# Patient Record
Sex: FEMALE | Race: BLACK OR AFRICAN AMERICAN | NOT HISPANIC OR LATINO | Employment: UNEMPLOYED | URBAN - METROPOLITAN AREA
[De-identification: names, ages, dates, MRNs, and addresses within clinical notes are randomized per-mention and may not be internally consistent; named-entity substitution may affect disease eponyms.]

---

## 2018-10-09 ENCOUNTER — HOSPITAL ENCOUNTER (INPATIENT)
Facility: CLINIC | Age: 18
LOS: 24 days | Discharge: PSYCHIATRIC OR SUBSTANCE USE FACILITY | End: 2018-11-02
Attending: EMERGENCY MEDICINE | Admitting: PSYCHIATRY & NEUROLOGY
Payer: COMMERCIAL

## 2018-10-09 DIAGNOSIS — F33.9 EPISODE OF RECURRENT MAJOR DEPRESSIVE DISORDER, UNSPECIFIED DEPRESSION EPISODE SEVERITY (H): ICD-10-CM

## 2018-10-09 DIAGNOSIS — R45.851 SUICIDE IDEATION: ICD-10-CM

## 2018-10-09 DIAGNOSIS — F25.1 SCHIZOAFFECTIVE DISORDER, DEPRESSIVE TYPE (H): Primary | ICD-10-CM

## 2018-10-09 PROBLEM — F29 PSYCHOSIS (H): Status: ACTIVE | Noted: 2018-10-09

## 2018-10-09 PROCEDURE — 99285 EMERGENCY DEPT VISIT HI MDM: CPT | Mod: Z6 | Performed by: EMERGENCY MEDICINE

## 2018-10-09 PROCEDURE — 90791 PSYCH DIAGNOSTIC EVALUATION: CPT

## 2018-10-09 PROCEDURE — 99223 1ST HOSP IP/OBS HIGH 75: CPT | Mod: AI | Performed by: PSYCHIATRY & NEUROLOGY

## 2018-10-09 PROCEDURE — 25000132 ZZH RX MED GY IP 250 OP 250 PS 637: Performed by: STUDENT IN AN ORGANIZED HEALTH CARE EDUCATION/TRAINING PROGRAM

## 2018-10-09 PROCEDURE — 12400007 ZZH R&B MH INTERMEDIATE UMMC

## 2018-10-09 PROCEDURE — 99285 EMERGENCY DEPT VISIT HI MDM: CPT | Mod: 25 | Performed by: EMERGENCY MEDICINE

## 2018-10-09 RX ORDER — POLYETHYLENE GLYCOL 3350 17 G
2-4 POWDER IN PACKET (EA) ORAL
Status: DISCONTINUED | OUTPATIENT
Start: 2018-10-09 | End: 2018-11-02 | Stop reason: HOSPADM

## 2018-10-09 RX ORDER — ACETAMINOPHEN 325 MG/1
650 TABLET ORAL EVERY 4 HOURS PRN
Status: DISCONTINUED | OUTPATIENT
Start: 2018-10-09 | End: 2018-11-02 | Stop reason: HOSPADM

## 2018-10-09 RX ORDER — HYDROXYZINE HYDROCHLORIDE 25 MG/1
25 TABLET, FILM COATED ORAL EVERY 4 HOURS PRN
Status: DISCONTINUED | OUTPATIENT
Start: 2018-10-09 | End: 2018-10-25

## 2018-10-09 RX ORDER — OLANZAPINE 10 MG/2ML
10 INJECTION, POWDER, FOR SOLUTION INTRAMUSCULAR
Status: DISCONTINUED | OUTPATIENT
Start: 2018-10-09 | End: 2018-10-25

## 2018-10-09 RX ORDER — OLANZAPINE 10 MG/1
10 TABLET ORAL
Status: DISCONTINUED | OUTPATIENT
Start: 2018-10-09 | End: 2018-10-25

## 2018-10-09 RX ADMIN — OLANZAPINE 10 MG: 10 TABLET, FILM COATED ORAL at 22:06

## 2018-10-09 RX ADMIN — HYDROXYZINE HYDROCHLORIDE 25 MG: 25 TABLET ORAL at 22:06

## 2018-10-09 ASSESSMENT — ENCOUNTER SYMPTOMS
ABDOMINAL PAIN: 0
SHORTNESS OF BREATH: 0
NECK STIFFNESS: 0
FEVER: 0
WOUND: 1
COLOR CHANGE: 0
EYE REDNESS: 0
HEADACHES: 0
SLEEP DISTURBANCE: 1
DIFFICULTY URINATING: 0
ARTHRALGIAS: 0
DYSPHORIC MOOD: 1
CONFUSION: 0

## 2018-10-09 NOTE — ED NOTES
I have performed an in person assessment of the patient. Based on this assessment the patient no longer requires a one on one attendant at this point in time.    SHNE WOLF MD, MD  8:21 AM  October 9, 2018         Shen Wolf MD  10/09/18 0821

## 2018-10-09 NOTE — IP AVS SNAPSHOT
MRN:[id number]                      After Visit Summary   10/9/2018    Nelida Montano    MRN: [id number]           Thank you!     Thank you for choosing [facility] for your care. Our goal is always to provide you with excellent care.        Patient Information     Date Of Birth          2000        Designated Caregiver       Most Recent Value    Caregiver    Will someone help with your care after discharge? no      About your hospital stay     You were admitted on:  October 9, 2018 You last received care in the:  [facility]    You were discharged on:  November 2, 2018       Who to Call     For medical emergencies, please call 911.  For non-urgent questions about your medical care, please call your primary care provider or clinic, None          Attending Provider     Provider Specialty    Shen Parikh MD Emergency Medicine    Nghia Duncan MD Psychiatry    Kylah, Radha Cullen MD Psychiatry       Primary Care Provider Fax #    Physician No Ref-Primary [phone]      Further instructions from your care team        Behavioral Discharge Planning and Instructions      Summary:  You were admitted on 10/9/2018  due to Psychotic Symptomology and Suicidal Ideations.  You were treated by Dr. Nghia Duncan MD and Dr. Radha Montero MD and discharged on 11/2/18 from [facility] to [facility]    During your hospitalization a petition for commitment(MI) and Green was filed with [facility] and supported.  On 10/25/18 the court issued an order for Commitment and the petition for Green was dismissed.  You will discharge today from the hospital with a Provisional Discharge Agreement (see your copy) The terms of your Provisional Discharge will remain in effect for the duration of your commitment unless the Provisional Discharge plan is later modified.  The initial period of your commitment is effective is 6 months from the date of the order - unless this date is changed by the  courts prior thereto.     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 Continue working with your  for ongoing resources and to allow for monitoring of the terms of your commitment,      Monday November 19, 2018 - 8:20am arrive - 8:40am appt.  Psychiatrist - Dr. Shen Yee     [facility] -[address] [place] [address] Phone: [phone] Fax: [phone]  It is very important you attend this appointment as you missed without canceling a prior intake appointment. If you miss without canceling/ rescheduling minimally 24 hour in advance you will not be allowed to schedule with this clinic in the future.   Please bring your insurance card, ID and medication list from the [facility] program.     Attend all scheduled appointments with your outpatient providers. Call at least 24 hours in advance if you need to reschedule an appointment to ensure continued access to your outpatient providers.   Major Treatments, Procedures and Findings:  You were provided with: a psychiatric assessment, assessed for medical stability, medication evaluation and/or management, group therapy and milieu management    Symptoms to Report: feeling more aggressive, increased confusion, losing more sleep, mood getting worse or thoughts of suicide    Early warning signs can include: increased depression or anxiety sleep disturbances increased thoughts or behaviors of suicide or self-harm   "increased unusual thinking, such as paranoia or hearing voices    Safety and Wellness:  Take all medicines as directed.  Make no changes unless your doctor suggests them.      Follow treatment recommendations.  Refrain from alcohol and non-prescribed drugs.     Resources:   Crisis Intervention: 891.423.9335 or 226-521-6036 (TTY: 682.822.9430).  Call anytime for help.  National [place] on Mental Illness (www.mn.amnohar.org): 194.121.6344 or 134-008-4083.  Suicide Awareness Voices of Education (SAVE) (www.save.org): 884-511-SAVE (6983)  Text 4 Life: txt \"LIFE\" to 49932 for immediate support and crisis intervention  Crisis text line: Text \"[place]\" to 703608. Free, confidential, 24/7.  Crisis Intervention: 105.791.7164 or 290-624-9357. Call anytime for help.   [facility] Crisis Team:  [phone]    The treatment team has appreciated the opportunity to work with you.     If you have any questions or concerns our unit number is [phone]        Pending Results     No orders found from 10/7/2018 to 10/10/2018.            Statement of Approval     Ordered          11/02/18 0850  I have reviewed and agree with all the recommendations and orders detailed in this document.  EFFECTIVE NOW     Approved and electronically signed by:  Manuel Dhillon MD             Admission Information     Date & Time Provider Department Dept. Phone    10/9/2018 Radha Montero MD [facility] [phone]      Your Vitals Were     Blood Pressure Pulse Temperature Respirations Height Weight    122/78 (BP Location: Left arm) 101 96.6  F (35.9  C) (Tympanic) 16 1.6 m (5' 3\") 67.4 kg (148 lb 8 oz)    Last Period Pulse Oximetry BMI (Body Mass Index)             (LMP Unknown) 100% 26.31 kg/m2         MyChart Information     [employer] lets you send messages to your doctor, view your test results, renew your prescriptions, schedule appointments and more. To sign up, go to [email or web address] . Click on \"Log in\" on " "the left side of the screen, which will take you to the Welcome page. Then click on \"Sign up Now\" on the right side of the page.     You will be asked to enter the access code listed below, as well as some personal information. Please follow the directions to create your username and password.     Your access code is: [email or web address]  Expires: 2019 12:53 PM     Your access code will  in 90 days. If you need help or a new code, please call your [facility] clinic or [phone].        Care EveryWhere ID     This is your Care EveryWhere ID. This could be used by other organizations to access your [facility] medical records  [id number]        Equal Access to Services     MARYA GRIFFIN : Ann Pacheco, barrie figueredo, fercho rodriguez, sophie gleason . So [facility] [phone].    ATENCIÓN: Si habla español, tiene a sheppard disposición servicios gratuitos de asistencia lingüística. Llame al [phone].    We comply with applicable federal civil rights laws and [place] laws. We do not discriminate on the basis of race, color, national origin, age, disability, sex, sexual orientation, or gender identity.               Review of your medicines      START taking        Dose / Directions    melatonin 3 MG tablet   Used for:  Episode of recurrent major depressive disorder, unspecified depression episode severity (H)        Dose:  3 mg   Take 1 tablet (3 mg) by mouth every evening   Quantity:  30 tablet   Refills:  0       risperiDONE 4 MG tablet   Commonly known as:  risperDAL        Dose:  4 mg   Take 1 tablet (4 mg) by mouth At Bedtime   Quantity:  30 tablet   Refills:  0       risperiDONE microspheres 50 MG injection   Commonly known as:  risperDAL CONSTA        Dose:  50 mg   Start taking on:  2018   Inject 2 mLs (50 mg) into the muscle every 14 days   Quantity:  2 each   Refills:  0         STOP taking     LEXAPRO PO           OLANZAPINE PO           TRAZODONE HCL " PO                Where to get your medicines      These medications were sent to [facility] [place] [address] - [place], [place] - [address] [facility]  [address] [address]     Phone:  [phone]     melatonin 3 MG tablet    risperiDONE 4 MG tablet    risperiDONE microspheres 50 MG injection                Protect others around you: Learn how to safely use, store and throw away your medicines at www.disposemymeds.org.             Medication List: This is a list of all your medications and when to take them. Check marks below indicate your daily home schedule. Keep this list as a reference.      Medications           Morning Afternoon Evening Bedtime As Needed    melatonin 3 MG tablet   Take 1 tablet (3 mg) by mouth every evening   Last time this was given:  3 mg on 11/1/2018  6:59 PM                                risperiDONE 4 MG tablet   Commonly known as:  risperDAL   Take 1 tablet (4 mg) by mouth At Bedtime   Last time this was given:  4 mg on 11/1/2018  6:58 PM                                risperiDONE microspheres 50 MG injection   Commonly known as:  risperDAL CONSTA   Inject 2 mLs (50 mg) into the muscle every 14 days   Start taking on:  11/8/2018   Last time this was given:  25 mg on 10/25/2018  9:54 PM

## 2018-10-09 NOTE — ED NOTES
ED to Behavioral Floor Handoff    SITUATION  Nelida Montano is a 18 year old female who speaks English and lives in a home with family members The patient arrived in the ED by private car from home with a complaint of Suicidal  .The patient's current symptoms started/worsened 2 month(s) ago and during this time the symptoms have increased.   In the ED, pt was diagnosed with   Final diagnoses:   None        Initial vitals were: BP: 133/80  Pulse: 85  Temp: 98.3  F (36.8  C)  Resp: 16  SpO2: 100 %   --------  Is the patient diabetic? No   If yes, last blood glucose? --     If yes, was this treated in the ED? --  --------  Is the patient inebriated (ETOH) No or Impaired on other substances? No  MSSA done? N/A  Last MSSA score: --    Were withdrawal symptoms treated? N/A  Does the patient have a seizure history? No. If yes, date of most recent seizure--  --------  Is the patient patient experiencing suicidal ideation? has a history of suicidal attempts, most recent in the past 3 months.     Homicidal ideation? denies current or recent homicidal ideation or behaviors.    Self-injurious behavior/urges? denies current or recent self injurious behavior or ideation.  ------  Was pt aggressive in the ED No  Was a code called No  Is the pt now cooperative? Yes  -------  Meds given in ED: Medications - No data to display   Family present during ED course? No  Family currently present? No    BACKGROUND  Does the patient have a cognitive impairment or developmental disability? No  Allergies: Allergies no known allergies.   Social demographics are   Social History     Social History     Marital status: Single     Spouse name: N/A     Number of children: N/A     Years of education: N/A     Social History Main Topics     Smoking status: Current Every Day Smoker     Packs/day: 2.00     Smokeless tobacco: Never Used     Alcohol use No     Drug use: No     Sexual activity: Not Asked     Other Topics Concern     None     Social  History Narrative     None        ASSESSMENT  Labs results Labs Ordered and Resulted from Time of ED Arrival Up to the Time of Departure from the ED - No data to display   Imaging Studies: No results found for this or any previous visit (from the past 24 hour(s)).   Most recent vital signs /80  Pulse 85  Temp 98.3  F (36.8  C) (Oral)  Resp 16  LMP  (LMP Unknown)  SpO2 100%   Abnormal labs/tests/findings requiring intervention:---   Pain control: pt had none  Nausea control: pt had none    RECOMMENDATION  Are any infection precautions needed (MRSA, VRE, etc.)? No If yes, what infection? --  ---  Does the patient have mobility issues? independently. If yes, what device does the pt use? ---  ---  Is patient on 72 hour hold or commitment? Yes If on 72 hour hold, have hold and rights been given to patient? Yes  Are admitting orders written if after 10 p.m. ?N/A  Tasks needing to be completed:---     AZIZA SLADE--    [phone] [facility]   6-4617 [facility]

## 2018-10-09 NOTE — IP AVS SNAPSHOT
"    [facility]: [phone]                                              INTERAGENCY TRANSFER FORM - PHYSICIAN ORDERS   10/9/2018                    Hospital Admission Date: 10/9/2018  IDANIA ANGULO   : 2000  Sex: Female        Attending Provider: (none)    Allergies:  No Known Allergies    Infection:  None   Service:  MENTAL HEALT    Ht:  1.6 m (5' 3\")   Wt:  67.4 kg (148 lb 8 oz)   Admission Wt:  61 kg (134 lb 8 oz)    BMI:  26.31 kg/m 2   BSA:  1.73 m 2            Patient PCP Information     Provider PCP Type    Physician No Ref-Primary General      ED Clinical Impression     Diagnosis Description Comment Added By Time Added    Suicide ideation [R45.851] Suicide ideation [R45.851]  Shen Parikh MD 10/9/2018 10:50 AM    Episode of recurrent major depressive disorder, unspecified depression episode severity (H) [F33.9] Episode of recurrent major depressive disorder, unspecified depression episode severity (H) [F33.9]  Shen Parikh MD 10/9/2018 10:51 AM      Hospital Problems as of 2018              Priority Class Noted POA    Psychosis (H) Medium  10/9/2018 Yes      Non-Hospital Problems as of 2018     None      Code Status History     Date Active Date Inactive Code Status Order ID Comments User Context    10/9/2018 12:11 PM 2018 12:13 PM Full Code [id number]  Raya Lawson MD Inpatient         Medication Review      START taking        Dose / Directions Comments    melatonin 3 MG tablet   Used for:  Episode of recurrent major depressive disorder, unspecified depression episode severity (H)        Dose:  3 mg   Take 1 tablet (3 mg) by mouth every evening   Quantity:  30 tablet   Refills:  0        risperiDONE 4 MG tablet   Commonly known as:  risperDAL   Used for:  Schizoaffective disorder, depressive type (H)        Dose:  4 mg   Take 1 tablet (4 mg) by mouth At Bedtime   Quantity:  30 tablet   Refills:  0        risperiDONE microspheres 50 MG injection   Commonly known as:  " risperDAL CONSTA   Used for:  Schizoaffective disorder, depressive type (H)        Dose:  50 mg   Start taking on:  11/8/2018   Inject 2 mLs (50 mg) into the muscle every 14 days   Quantity:  2 each   Refills:  0          STOP taking     LEXAPRO PO           OLANZAPINE PO           TRAZODONE HCL PO                     Further instructions from your care team        Behavioral Discharge Planning and Instructions      Summary:  You were admitted on 10/9/2018  due to Psychotic Symptomology and Suicidal Ideations.  You were treated by Dr. Nghia Duncan MD and Dr. Radha Montero MD and discharged on 11/2/18 from [facility] to [facility]    During your hospitalization a petition for commitment(MI) and Green was filed with [facility] and supported.  On 10/25/18 the court issued an order for Commitment and the petition for Green was dismissed.  You will discharge today from the hospital with a Provisional Discharge Agreement (see your copy) The terms of your Provisional Discharge will remain in effect for the duration of your commitment unless the Provisional Discharge plan is later modified.  The initial period of your commitment is effective is 6 months from the date of the order - unless this date is changed by the courts prior thereto.     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 Continue working with your  for ongoing resources and to allow for monitoring of the terms of your  "commitment,      Monday November 19, 2018 - 8:20am arrive - 8:40am appt.  Psychiatrist - Dr. Shen Yee     [facility] -[address] [place] [address] [address] [place] [address] Phone: [phone] Fax: [phone]  It is very important you attend this appointment as you missed without canceling a prior intake appointment. If you miss without canceling/ rescheduling minimally 24 hour in advance you will not be allowed to schedule with this clinic in the future.   Please bring your insurance card, ID and medication list from the [facility] program.     Attend all scheduled appointments with your outpatient providers. Call at least 24 hours in advance if you need to reschedule an appointment to ensure continued access to your outpatient providers.   Major Treatments, Procedures and Findings:  You were provided with: a psychiatric assessment, assessed for medical stability, medication evaluation and/or management, group therapy and milieu management    Symptoms to Report: feeling more aggressive, increased confusion, losing more sleep, mood getting worse or thoughts of suicide    Early warning signs can include: increased depression or anxiety sleep disturbances increased thoughts or behaviors of suicide or self-harm  increased unusual thinking, such as paranoia or hearing voices    Safety and Wellness:  Take all medicines as directed.  Make no changes unless your doctor suggests them.      Follow treatment recommendations.  Refrain from alcohol and non-prescribed drugs.     Resources:   Crisis Intervention: 918.626.9149 or 208-865-6196 (TTY: 695.133.3610).  Call anytime for help.  National [place] on Mental Illness (www.[place].manohar.org): 532.141.7240 or 425-149-6817.  Suicide Awareness Voices of Education (SAVE) (www.save.org): 880-511-SAVE (7283)  Text 4 Life: txt \"LIFE\" to 89296 for immediate support and crisis intervention  Crisis text line: Text \"[place]\" to 573111. Free, confidential, 24/7.  Crisis " Intervention: 424.288.8455 or 410-255-5744. Call anytime for help.   [facility] Crisis Team:  [phone]    The treatment team has appreciated the opportunity to work with you.     If you have any questions or concerns our unit number is [phone]        Statement of Approval     Ordered          11/02/18 0850  I have reviewed and agree with all the recommendations and orders detailed in this document.  EFFECTIVE NOW     Approved and electronically signed by:  Manuel Dhillon MD

## 2018-10-09 NOTE — PLAN OF CARE
"Problem: Mood Impairment (Depressive Signs/Symptoms) (Adult)  Goal: Improved Mood Symptoms (Depressive Signs/Symptoms)  Outcome: No Change  When asked why patient was admitted into the hospital patient's response was \"I cut myself\". Patient is very flat and blunted. Answered all the questions this writer asked her but responses were delayed and sometimes patient would tell this writer did not understand the question and would want writer to ask the question again. Patient states depression is at a 10 with 10 high. Denies anxiety. Denies thoughts of hurting self currently but when asked if wanted to hurt self in the future would she tell staff and patient said \"I don't know\". Patient states sleep is poor and has difficulty going to sleep and eating is poor. Ate lunch in room. Cut on left wrist is 2 inches long and 1/4 inch wide in the center of the cut. ER report was nurse put Bacitracin on wound this morning and covered it with a dressing. Patient states was feeling tired and appeared to be sleeping this afternoon. Most of admission profile was completed, patient wanted to stop and sleep for awhile.      "

## 2018-10-09 NOTE — IP AVS SNAPSHOT
[place] [address]    [place] [address]    Phone:  [phone]                                       After Visit Summary   10/9/2018    Nelida Montano    MRN: [id number]           After Visit Summary Signature Page     I have received my discharge instructions, and my questions have been answered. I have discussed any challenges I see with this plan with the nurse or doctor.    ..........................................................................................................................................  Patient/Patient Representative Signature      ..........................................................................................................................................  Patient Representative Print Name and Relationship to Patient    ..................................................               ................................................  Date                                   Time    ..........................................................................................................................................  Reviewed by Signature/Title    ...................................................              ..............................................  Date                                               Time          22EPIC Rev 08/18

## 2018-10-09 NOTE — ED PROVIDER NOTES
"  History     Chief Complaint   Patient presents with     Suicidal     HPI  Nelida Montano is a 18 year old female who is to the emergency department for evaluation of a self-inflicted left wrist laceration and depression with suicide ideations.  Patient is right-hand dominant.  She is a history of depression with psychotic features.  She was hospitalized at [facility] in August and September of this year.  She has not been taking her medications.  Patient reports that she has been feeling increasingly depressed.  She states that she lacerated her left wrist at the site of her previous scar yesterday morning as an attempt to kill herself.  She reports that her sleep is disturbed as she \"cannot turn my brain off.\"  Patient's tetanus immunization is up-to-date.  Denies any distal weakness or numbness of her hand.  Her mother reports that she stares off into space much of the time throughout the day.  She does not answer questions about ongoing hallucinations in the emergency department.  She denies any recent illness.  She denies any medical concerns.  She did attempt suicide in August by lacerating her wrist and in September by overdose.    I have reviewed the Medications, Allergies, Past Medical and Surgical History, and Social History in the Epic system.    Review of Systems   Constitutional: Negative for fever.   HENT: Negative for congestion.    Eyes: Negative for redness.   Respiratory: Negative for shortness of breath.    Cardiovascular: Negative for chest pain.   Gastrointestinal: Negative for abdominal pain.   Genitourinary: Negative for difficulty urinating.   Musculoskeletal: Negative for arthralgias and neck stiffness.   Skin: Positive for wound. Negative for color change.   Neurological: Negative for headaches.   Psychiatric/Behavioral: Positive for dysphoric mood, sleep disturbance and suicidal ideas. Negative for confusion.   All other systems reviewed and are " "negative.      Physical Exam   BP: 133/80  Pulse: 85  Temp: 98.3  F (36.8  C)  Resp: 16  Height: 160 cm (5' 3\")  Weight: 61 kg (134 lb 8 oz)  SpO2: 100 %      Physical Exam   Constitutional: She is oriented to person, place, and time. She appears well-developed and well-nourished. No distress.   HENT:   Head: Normocephalic and atraumatic.   Mouth/Throat: Oropharynx is clear and moist. No oropharyngeal exudate.   Eyes: Pupils are equal, round, and reactive to light. No scleral icterus.   Neck: Normal range of motion.   Cardiovascular: Normal rate, regular rhythm, normal heart sounds and intact distal pulses.    Pulses:       Radial pulses are 2+ on the right side, and 2+ on the left side.   Pulmonary/Chest: Effort normal and breath sounds normal. No respiratory distress.   Abdominal: Soft. Bowel sounds are normal. There is no tenderness.   Musculoskeletal: Normal range of motion. She exhibits no edema or tenderness.        Arms:       Left hand: Normal. She exhibits normal range of motion and normal capillary refill. Normal sensation noted. Normal strength noted.   Neurological: She is alert and oriented to person, place, and time. She has normal strength. Coordination normal. GCS eye subscore is 4. GCS verbal subscore is 5. GCS motor subscore is 6.   Skin: Skin is warm and dry. No rash noted. She is not diaphoretic.   Nursing note and vitals reviewed.      ED Course     ED Course     Procedures        Seen by [facility] - see note for complete details.    Critical Care time:               Assessments & Plan (with Medical Decision Making)   18 year old right-hand-dominant female to the emergency department with left wrist laceration.  This was self-inflicted yesterday as a suicide attempt.  The wound is open but not actively bleeding.  The base appears clean.  It is approximately 24 hours old and not actively bleeding so will not be repaired at this time as risk of infection outweighs benefit of closure at this " point.  Patient's tetanus immunization is up-to-date.  Her distal CMS is normal.  The patient appears quite depressed clinically.  She is slow to respond.  She is withdrawn.  She reports inability to sleep due to thoughts in her brain.  She has a history of psychosis in the past.  It is not clear if she is hallucinating today although it does seem quite possible.  The patient does not appear appropriate for outpatient management.  She has had 2 previous suicide attempts within the past 2 months.  Patient will be admitted to psychiatric services.  Patient is medically stable for psychiatric admission per    I have reviewed the nursing notes.    I have reviewed the findings, diagnosis, plan and need for follow up with the patient.    Current Discharge Medication List          Final diagnoses:   Suicide ideation   Episode of recurrent major depressive disorder, unspecified depression episode severity (H)       10/9/2018   [facility] [place], EMERGENCY DEPARTMENT     Shen Parikh MD  10/09/18 1529
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
## 2018-10-09 NOTE — PLAN OF CARE
Problem: Patient Care Overview  Goal: Team Discussion  Team Plan:   BEHAVIORAL TEAM DISCUSSION    Participants: Nghia Duncan MD Attending Psychiatrist; Raya Lawson DO PGY1; Wu Crowley MD PGY2; Irene Serra [facility], Clinical Treatment Coordinator; Erin Haider, MS4; Nina Epstein, MS3  Progress: Initial  Continued Stay Criteria/Rationale: 72HH: Psychosis /  SI  Medical/Physical: Deferred to medicine  Precautions:   Behavioral Orders   Procedures     Code 1 - Restrict to Unit     Elopement precautions     Routine Programming     As clinically indicated     Self Injury Precaution     Status 15     Every 15 minutes.     Suicide precautions     Patients on Suicide Precautions should have a Combination Diet ordered that includes a Diet selection(s) AND a Behavioral Tray selection for Safe Tray - with utensils, or Safe Tray - NO utensils     Plan: The plan is to assess and patient for mental health and medication needs.  The patient will be prescribed medications to treat the identified symptoms.  Upon discharge the patient will be referred to services as appropriate based on the assessment.  Rationale for change in precautions or plan: No change
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
## 2018-10-10 LAB
ALBUMIN SERPL-MCNC: 3.5 G/DL (ref 3.4–5)
ALP SERPL-CCNC: 46 U/L (ref 40–150)
ALT SERPL W P-5'-P-CCNC: 18 U/L (ref 0–50)
ANION GAP SERPL CALCULATED.3IONS-SCNC: 7 MMOL/L (ref 3–14)
AST SERPL W P-5'-P-CCNC: 12 U/L (ref 0–45)
BASOPHILS # BLD AUTO: 0 10E9/L (ref 0–0.2)
BASOPHILS NFR BLD AUTO: 0.2 %
BILIRUB SERPL-MCNC: 0.9 MG/DL (ref 0.2–1.3)
BUN SERPL-MCNC: 10 MG/DL (ref 7–30)
CALCIUM SERPL-MCNC: 8.6 MG/DL (ref 8.5–10.1)
CHLORIDE SERPL-SCNC: 109 MMOL/L (ref 94–109)
CHOLEST SERPL-MCNC: 135 MG/DL
CO2 SERPL-SCNC: 28 MMOL/L (ref 20–32)
CREAT SERPL-MCNC: 0.91 MG/DL (ref 0.52–1.04)
DIFFERENTIAL METHOD BLD: NORMAL
EOSINOPHIL # BLD AUTO: 0.1 10E9/L (ref 0–0.7)
EOSINOPHIL NFR BLD AUTO: 1.1 %
ERYTHROCYTE [DISTWIDTH] IN BLOOD BY AUTOMATED COUNT: 13.2 % (ref 10–15)
FOLATE SERPL-MCNC: 11.8 NG/ML
GFR SERPL CREATININE-BSD FRML MDRD: 78 ML/MIN/1.7M2
GLUCOSE SERPL-MCNC: 74 MG/DL (ref 70–99)
HCT VFR BLD AUTO: 38.1 % (ref 35–47)
HDLC SERPL-MCNC: 62 MG/DL
HGB BLD-MCNC: 12.2 G/DL (ref 11.7–15.7)
IMM GRANULOCYTES # BLD: 0 10E9/L (ref 0–0.4)
IMM GRANULOCYTES NFR BLD: 0 %
LDLC SERPL CALC-MCNC: 63 MG/DL
LYMPHOCYTES # BLD AUTO: 3.4 10E9/L (ref 0.8–5.3)
LYMPHOCYTES NFR BLD AUTO: 54.7 %
MCH RBC QN AUTO: 30.8 PG (ref 26.5–33)
MCHC RBC AUTO-ENTMCNC: 32 G/DL (ref 31.5–36.5)
MCV RBC AUTO: 96 FL (ref 78–100)
MONOCYTES # BLD AUTO: 0.5 10E9/L (ref 0–1.3)
MONOCYTES NFR BLD AUTO: 8.6 %
NEUTROPHILS # BLD AUTO: 2.2 10E9/L (ref 1.6–8.3)
NEUTROPHILS NFR BLD AUTO: 35.4 %
NONHDLC SERPL-MCNC: 73 MG/DL
NRBC # BLD AUTO: 0 10*3/UL
NRBC BLD AUTO-RTO: 0 /100
PLATELET # BLD AUTO: 230 10E9/L (ref 150–450)
POTASSIUM SERPL-SCNC: 3.9 MMOL/L (ref 3.4–5.3)
PROT SERPL-MCNC: 6.6 G/DL (ref 6.8–8.8)
RBC # BLD AUTO: 3.96 10E12/L (ref 3.8–5.2)
SODIUM SERPL-SCNC: 144 MMOL/L (ref 133–144)
TRIGL SERPL-MCNC: 50 MG/DL
TSH SERPL DL<=0.005 MIU/L-ACNC: 0.43 MU/L (ref 0.4–4)
VIT B12 SERPL-MCNC: 915 PG/ML (ref 193–986)
WBC # BLD AUTO: 6.2 10E9/L (ref 4–11)

## 2018-10-10 PROCEDURE — 80061 LIPID PANEL: CPT | Performed by: PSYCHIATRY & NEUROLOGY

## 2018-10-10 PROCEDURE — 82746 ASSAY OF FOLIC ACID SERUM: CPT | Performed by: PSYCHIATRY & NEUROLOGY

## 2018-10-10 PROCEDURE — 36415 COLL VENOUS BLD VENIPUNCTURE: CPT | Performed by: PSYCHIATRY & NEUROLOGY

## 2018-10-10 PROCEDURE — 84443 ASSAY THYROID STIM HORMONE: CPT | Performed by: PSYCHIATRY & NEUROLOGY

## 2018-10-10 PROCEDURE — 82607 VITAMIN B-12: CPT | Performed by: PSYCHIATRY & NEUROLOGY

## 2018-10-10 PROCEDURE — 99232 SBSQ HOSP IP/OBS MODERATE 35: CPT | Mod: GC | Performed by: PSYCHIATRY & NEUROLOGY

## 2018-10-10 PROCEDURE — 80053 COMPREHEN METABOLIC PANEL: CPT | Performed by: PSYCHIATRY & NEUROLOGY

## 2018-10-10 PROCEDURE — 12400007 ZZH R&B MH INTERMEDIATE UMMC

## 2018-10-10 PROCEDURE — 25000132 ZZH RX MED GY IP 250 OP 250 PS 637: Performed by: STUDENT IN AN ORGANIZED HEALTH CARE EDUCATION/TRAINING PROGRAM

## 2018-10-10 PROCEDURE — 85025 COMPLETE CBC W/AUTO DIFF WBC: CPT | Performed by: PSYCHIATRY & NEUROLOGY

## 2018-10-10 RX ORDER — OLANZAPINE 10 MG/1
10 TABLET, ORALLY DISINTEGRATING ORAL AT BEDTIME
Status: DISCONTINUED | OUTPATIENT
Start: 2018-10-10 | End: 2018-10-11

## 2018-10-10 RX ORDER — ESCITALOPRAM OXALATE 5 MG/1
5 TABLET ORAL DAILY
Status: DISCONTINUED | OUTPATIENT
Start: 2018-10-10 | End: 2018-10-15

## 2018-10-10 RX ORDER — TRAZODONE HYDROCHLORIDE 50 MG/1
50 TABLET, FILM COATED ORAL
Status: DISCONTINUED | OUTPATIENT
Start: 2018-10-10 | End: 2018-10-18

## 2018-10-10 RX ADMIN — ESCITALOPRAM OXALATE 5 MG: 5 TABLET, FILM COATED ORAL at 14:52

## 2018-10-10 RX ADMIN — OLANZAPINE 10 MG: 10 TABLET, ORALLY DISINTEGRATING ORAL at 21:45

## 2018-10-10 NOTE — PROGRESS NOTES
Patient spent much of the shift in her room and in bed appearing to sleep.  Patient was out for dinner and ate most of her meal.   Patient was brief in 1:1 with writer.  Patient did state she was having thoughts of SI/SIB but contracted verbally for safety.  Patient stated she would seek out staff if she felt like acting on those thoughts.       10/09/18 1665   Behavioral Health   Hallucinations denies / not responding to hallucinations   Thinking poor concentration   Orientation person: oriented;place: oriented   Memory baseline memory   Insight poor   Judgement impaired   Eye Contact at floor;out of corner of eyes   Affect blunted, flat   Mood depressed;hopeless   Physical Appearance/Attire attire appropriate to age and situation   Hygiene well groomed   Suicidality thoughts only   1. Wish to be Dead No   2. Non-Specific Active Suicidal Thoughts  No   Self Injury thoughts only   Activity isolative;withdrawn   Speech clear;coherent   Medication Sensitivity no stated side effects   Psychomotor / Gait slow;balanced;steady   Psycho Education   Type of Intervention 1:1 intervention   Response participates with encouragement   Hours 0.5   Treatment Detail check-in

## 2018-10-10 NOTE — PROGRESS NOTES
RE - legal - petition for commitment with [facility] request -     Per attending plan to initiate petition for commitment with [facility] request - petition called in to [facility] - report taken. Screener Graciela SIMS with [facility] came to unit and met with patient and will follow up with this writer.
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
## 2018-10-10 NOTE — PROGRESS NOTES
"CLINICAL NUTRITION SERVICES - ASSESSMENT NOTE     Nutrition Prescription    RECOMMENDATIONS FOR MDs/PROVIDERS TO ORDER:  Consider daily MVI with minerals to ensure micronutrient needs met with hx poor intakes and severe weight loss.    Malnutrition Status:    Severe malnutrition in the context of environmental or social circumstances.      Recommendations already ordered by Registered Dietitian (RD):  -Ordered Boost Breeze tid with meals (vary flavors)    Future/Additional Recommendations:  -Encourage intakes of tid meals plus supplements and snacks between as desired  -Monitor po intakes and weight trends.  Follow up for supplement acceptance and adjust as indicated.     REASON FOR ASSESSMENT  Nelida Montano is a/an 18 year old female assessed by the dietitian for Admission Nutrition Risk Screen for reduced oral intake over the last month    NUTRITION HISTORY  Per chart review, pt \"with past psychiatric history of multiple suicide attempts who presented on 10/09/2018 due to SI, cutting, and command auditory hallucinations. According to [facility] assessment, patient was dropped off at the [facility] by her mother due to a cut on her forearm that the mother thought needed stitches. Patient reports two months of worsening depression with command auditory hallucinations telling her to kill herself.\"    \"Mother states Nelida is \"apologetic for no reason at all\" and has not been eating or drinking.\"    Per chart review, pt was seen by RD during recent hospitalization.  Per RD note (9/1/18):  Nutrition Assessment  Reason for visit:Consult: Decreased Appetite and Unintentional Weight Loss    Malnutrition: Currently does not meet malnutrition criteria    Nutrition Interventions  Medical Food Supplements/commercial beverage: Boost Plus BID (strawberry)   Continues a Regular diet as tolerated. Encouraged intake.  Goal:Improve oral nutritional intake to greater than 75% of estimated nutritional needs  Outcome:Likely " "Met    Nutrition History: Regular diet PTA; voices \"picky about food\". Dislikes hospital food.  Diet Order: Regular  Intake: fair (50-75%); supplements added.  Nutrition Needs: Likely Met     CURRENT NUTRITION ORDERS  Diet: Regular  Intake/Tolerance:  \"Patient states sleep is poor and has difficulty going to sleep and eating is poor. Ate lunch in room.\" \"Patient spent much of the shift in her room and in bed appearing to sleep.  Patient was out for dinner and ate most of her meal.\"     Pt seen while eating lunch today in room.  She was minimally verbal during interview.  She did state does not like Boost Plus, but agreeable to trying Breeze with meals.      LABS  Labs reviewed  (10/10)  Na 144 (high end normal range)    MEDICATIONS  Medications reviewed    ANTHROPOMETRICS  Height: 160 cm (5' 3\")  Most Recent Weight (10/9): 61 kg (134 lb 8 oz)    IBW: 115 lbs  BMI: Normal BMI  Weight History:   Per chart review, hospital admission weight (8/29):  150 lbs. Based on current weight, decrease of 15.5 lbs or 10% x in less than 6 weeks.   Per hospital RD note (9/1) pt reported UBW:  150-160 lbs.  RD noted 6% loss over past 1-2 months. Weight (8/31/18)  141 lbs 11.2 oz (9/7/18)  141 lbs.   Wt Readings from Last 10 Encounters:   10/09/18 61 kg (134 lb 8 oz)       Dosing Weight: 61 kg (current weight)    ASSESSED NUTRITION NEEDS  Estimated Energy Needs: 5519-7402 kcals/day (30 - 35 kcals/kg )  Justification: Maintenance  Estimated Protein Needs: 73-92 grams protein/day (1.2-1.5 grams of pro/kg)  Justification: Repletion  Estimated Fluid Needs: (1 mL/kcal)   Justification: Per provider pending fluid status    PHYSICAL FINDINGS  See malnutrition section below.  Cut on left wrist is 2 inches long and 1/4 inch wide in the center of the cut    MALNUTRITION  % Intake: </=75% for >/= 1 month (non-severe)  % Weight Loss: > 5% in 1 month (severe)  Subcutaneous Fat Loss: None observed  Muscle Loss: None observed  Fluid " Accumulation/Edema: None noted  Malnutrition Diagnosis: Severe malnutrition in the context of environmental or social circumstances.      NUTRITION DIAGNOSIS  Inadequate oral intake related to decreased appetite with mental health status as evidenced by severe weight loss hx of 10% in less than 6 weeks, per family report not eating or drinking.       INTERVENTIONS  Implementation  Nutrition Education: Encouraged intakes of tid meals and discussed supplements.    Discussed nutrition plan with RN.    Goals  1. Patient to consume % of nutritionally adequate meal trays TID, or the equivalent with supplements/snacks.  2. Weight maintenance of at least 61 kg.       Monitoring/Evaluation  Progress toward goals will be monitored and evaluated per protocol.    Ellen Johnson RD, LD

## 2018-10-11 ENCOUNTER — TRANSFERRED RECORDS (OUTPATIENT)
Dept: HEALTH INFORMATION MANAGEMENT | Facility: CLINIC | Age: 18
End: 2018-10-11

## 2018-10-11 PROCEDURE — 25000132 ZZH RX MED GY IP 250 OP 250 PS 637: Performed by: STUDENT IN AN ORGANIZED HEALTH CARE EDUCATION/TRAINING PROGRAM

## 2018-10-11 PROCEDURE — 12400007 ZZH R&B MH INTERMEDIATE UMMC

## 2018-10-11 PROCEDURE — 99232 SBSQ HOSP IP/OBS MODERATE 35: CPT | Mod: GC | Performed by: PSYCHIATRY & NEUROLOGY

## 2018-10-11 RX ORDER — RISPERIDONE 1 MG/1
1 TABLET ORAL AT BEDTIME
Status: DISCONTINUED | OUTPATIENT
Start: 2018-10-11 | End: 2018-10-11

## 2018-10-11 RX ORDER — RISPERIDONE 1 MG/1
1 TABLET ORAL DAILY
Status: DISCONTINUED | OUTPATIENT
Start: 2018-10-11 | End: 2018-10-11

## 2018-10-11 RX ORDER — RISPERIDONE 1 MG/1
1 TABLET ORAL 2 TIMES DAILY
Status: DISCONTINUED | OUTPATIENT
Start: 2018-10-11 | End: 2018-10-12

## 2018-10-11 RX ADMIN — ESCITALOPRAM OXALATE 5 MG: 5 TABLET, FILM COATED ORAL at 10:40

## 2018-10-11 RX ADMIN — RISPERIDONE 1 MG: 1 TABLET ORAL at 14:43

## 2018-10-11 RX ADMIN — RISPERIDONE 1 MG: 1 TABLET ORAL at 20:31

## 2018-10-11 ASSESSMENT — ACTIVITIES OF DAILY LIVING (ADL)
LAUNDRY: WITH SUPERVISION
DRESS: INDEPENDENT
ORAL_HYGIENE: INDEPENDENT
DRESS: INDEPENDENT
ORAL_HYGIENE: INDEPENDENT
GROOMING: INDEPENDENT
GROOMING: INDEPENDENT

## 2018-10-11 NOTE — PROGRESS NOTES
Unable to meet, pt did not wake to meet when woken up. However, pt came out around 10:00am to take a shower then went right back to bed.        10/11/18 1420   Behavioral Health   Hallucinations (Unable to meet, pt did not wake to meet when woken up.)   Affect blunted, flat   Mood anxious   Physical Appearance/Attire attire appropriate to age and situation   Hygiene well groomed   Suicidality (Unable to meet, pt did not wake to meet when woken up.)   1. Wish to be Dead (Unable to meet, pt did not wake to meet when woken up.)   2. Non-Specific Active Suicidal Thoughts  (Unable to meet, pt did not wake to meet when woken up.)   Activity isolative;withdrawn   Activities of Daily Living   Hygiene/Grooming independent   Oral Hygiene independent   Dress independent   Laundry with supervision   Room Organization independent
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
## 2018-10-11 NOTE — PROGRESS NOTES
RE- petition for commitment ([place]) with Peter request - [facility]     This writer received call from Graciela daley with [facility] she reports that they are supporting commitment. Also states that because patient's 72 hour hold expires tomorrow court hold may not be sent from [facility] until tomorrow.     She will be in contact prior to preliminary hearing for updates.

## 2018-10-11 NOTE — PROGRESS NOTES
Writer unable to complete interview.  Pt rested in bed majority of the shift.  Pt emerged from room to retreive dinner then brought dinner back to room to eat it.  Pt was withdrawn, not socializing.  Pt's affect blunted.       10/10/18 2200   Behavioral Health   Hallucinations other (see comment)  (Unable to complete interview.)   Affect blunted, flat   Mood depressed   Physical Appearance/Attire attire appropriate to age and situation   Activity isolative;withdrawn   Speech coherent   Sleep/Rest/Relaxation   Day/Evening Time Hours resting in bed   Psycho Education   Type of Intervention 1:1 intervention   Response refuses

## 2018-10-11 NOTE — PROGRESS NOTES
Writer unable to complete interview.  Pt rested in bed majority of the shift.  Pt emerged from room to retreive dinner then brought dinner back to room to eat it.  Pt was withdrawn, not socializing.  Pt's affect blunted.

## 2018-10-12 PROCEDURE — 25000132 ZZH RX MED GY IP 250 OP 250 PS 637: Performed by: STUDENT IN AN ORGANIZED HEALTH CARE EDUCATION/TRAINING PROGRAM

## 2018-10-12 PROCEDURE — 99231 SBSQ HOSP IP/OBS SF/LOW 25: CPT | Mod: GC | Performed by: PSYCHIATRY & NEUROLOGY

## 2018-10-12 PROCEDURE — 12400007 ZZH R&B MH INTERMEDIATE UMMC

## 2018-10-12 RX ORDER — RISPERIDONE 2 MG/1
2 TABLET ORAL AT BEDTIME
Status: COMPLETED | OUTPATIENT
Start: 2018-10-12 | End: 2018-10-15

## 2018-10-12 RX ORDER — RISPERIDONE 1 MG/1
1 TABLET ORAL DAILY
Status: DISCONTINUED | OUTPATIENT
Start: 2018-10-13 | End: 2018-10-15

## 2018-10-12 RX ADMIN — ESCITALOPRAM OXALATE 5 MG: 5 TABLET, FILM COATED ORAL at 09:22

## 2018-10-12 RX ADMIN — RISPERIDONE 1 MG: 1 TABLET ORAL at 09:22

## 2018-10-12 RX ADMIN — RISPERIDONE 2 MG: 2 TABLET ORAL at 21:39

## 2018-10-12 ASSESSMENT — ACTIVITIES OF DAILY LIVING (ADL)
LAUNDRY: WITH SUPERVISION
GROOMING: INDEPENDENT
GROOMING: INDEPENDENT
DRESS: INDEPENDENT
ORAL_HYGIENE: INDEPENDENT
ORAL_HYGIENE: INDEPENDENT
DRESS: INDEPENDENT
LAUNDRY: WITH SUPERVISION

## 2018-10-12 NOTE — PLAN OF CARE
Problem: Mood Impairment (Depressive Signs/Symptoms) (Adult)  Goal: Improved Mood Symptoms (Depressive Signs/Symptoms)  Outcome: No Change  Patient isolates to room all shift. Comes out only for meals. Her affect is flat and mood is depressed. Responds to questions with one word answers. Patient wrist dressing was changed this afternoon. Her thoughts are clear. She is med compliant. Denies SI and SIB at present. Nicki Hamlin RN
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
## 2018-10-12 NOTE — PROGRESS NOTES
Pt was isolative and socially withdrawn throughout the shift. Pt was observed mostly sleeping in room but was seen briefly eating dinner. Pt presented with a blunt/flat affect and appeared tired. Pt did not report any major safety concerns and this  did not observe any.        10/11/18 2154   Behavioral Health   Hallucinations (unsubstantiated)   Thinking poor concentration   Orientation person: oriented   Memory (unsubstantiated)   Insight (unsubstantiated)   Judgement impaired   Eye Contact at floor;at examiner   Affect blunted, flat   Mood anxious;mood is calm   Physical Appearance/Attire attire appropriate to age and situation   Hygiene neglected grooming - unclean body, hair, teeth   Suicidality (pt did not report thoughts or urges)   Self Injury (pt did not report thoughts or urges)   Elopement (no apparent risk)   Activity isolative;withdrawn   Speech clear;coherent   Medication Sensitivity no stated side effects;no observed side effects   Psychomotor / Gait balanced;steady   Psycho Education   Type of Intervention structured groups   Response refuses   Hours 0.5   Treatment Detail (pt did not attend community meeting)   Activities of Daily Living   Hygiene/Grooming independent   Oral Hygiene independent   Dress independent   Laundry (pt did not complete)   Room Organization independent   Activity   Activity Assistance Provided independent

## 2018-10-12 NOTE — PLAN OF CARE
"Problem: Mood Impairment (Depressive Signs/Symptoms) (Adult)  Goal: Improved Mood Symptoms (Depressive Signs/Symptoms)  Outcome: No Change  48 RN ASSESSMENT     Pt has a flat affect on approach and answers in questions with one word responses. Pt states her mood is \"good,\" her appetite is \"good,\" and she has been sleeping \"good.\" Pt stayed her room most of the shift in bed. She came out to eat and was asked to come out to receive her medications. She was compliant with medications and cooperative.     Continue with current treatment plan and recommendations. Continue to monitor and reassess symptoms. Monitor response to medications. Monitor progress towards treatment goals. Encourage groups and participation.        "

## 2018-10-13 LAB
AMPHETAMINES UR QL SCN: NEGATIVE
BARBITURATES UR QL: NEGATIVE
BENZODIAZ UR QL: NEGATIVE
CANNABINOIDS UR QL SCN: POSITIVE
COCAINE UR QL: NEGATIVE
ETHANOL UR QL SCN: NEGATIVE
HCG UR QL: NEGATIVE
OPIATES UR QL SCN: NEGATIVE

## 2018-10-13 PROCEDURE — G0177 OPPS/PHP; TRAIN & EDUC SERV: HCPCS

## 2018-10-13 PROCEDURE — 81025 URINE PREGNANCY TEST: CPT | Performed by: EMERGENCY MEDICINE

## 2018-10-13 PROCEDURE — 12400007 ZZH R&B MH INTERMEDIATE UMMC

## 2018-10-13 PROCEDURE — 25000132 ZZH RX MED GY IP 250 OP 250 PS 637: Performed by: STUDENT IN AN ORGANIZED HEALTH CARE EDUCATION/TRAINING PROGRAM

## 2018-10-13 PROCEDURE — 80320 DRUG SCREEN QUANTALCOHOLS: CPT | Performed by: EMERGENCY MEDICINE

## 2018-10-13 PROCEDURE — 80307 DRUG TEST PRSMV CHEM ANLYZR: CPT | Performed by: EMERGENCY MEDICINE

## 2018-10-13 RX ADMIN — RISPERIDONE 2 MG: 2 TABLET ORAL at 20:38

## 2018-10-13 RX ADMIN — RISPERIDONE 1 MG: 1 TABLET ORAL at 09:40

## 2018-10-13 RX ADMIN — TRAZODONE HYDROCHLORIDE 50 MG: 50 TABLET ORAL at 20:39

## 2018-10-13 RX ADMIN — ESCITALOPRAM OXALATE 5 MG: 5 TABLET, FILM COATED ORAL at 09:39

## 2018-10-13 ASSESSMENT — ACTIVITIES OF DAILY LIVING (ADL)
ORAL_HYGIENE: INDEPENDENT
GROOMING: INDEPENDENT
ORAL_HYGIENE: INDEPENDENT
GROOMING: INDEPENDENT
DRESS: INDEPENDENT
DRESS: INDEPENDENT

## 2018-10-13 NOTE — PROGRESS NOTES
Initial assessment started but not complete, pt has not received self-assessment yet.    Pt participated with positive affect and remained pleasant,though pt remained minimally verbal, pt answered basic questions when asked, though primarily kept to self and maintained focused attention on project for duration of OT group.  Topic: Pt participated in OT clinic, working on completing a self-initiated project facilitated by OT and graded to appropriate functional performance.

## 2018-10-13 NOTE — PROGRESS NOTES
"  -----------------------------------------------------------------------------------------------------------  Psychiatry Cross Cover note      Nelida Montano MRN# [id number]   Age: 18 year old YOB: 2000   LOS: 4 Days          Subjective:     Writer was notified by staff that patient's left forearm laceration has been weeping. Staff changed the bandage this morning. No signs of infection.    Met with patient who has no major concerns over wound. No new pain or redness noted.  No further opening of wound. Patient has been wearing bandage throughout day. No damage to wrist that could have worsened laceration opening.          Objective:    /62  Pulse 97  Temp 96  F (35.6  C) (Tympanic)  Resp 16  Ht 1.6 m (5' 3\")  Wt 61 kg (134 lb 8 oz)  LMP  (LMP Unknown)  SpO2 100%  BMI 23.83 kg/m2  134 lbs 8 oz  Body mass index is 23.83 kg/(m^2).  General: Awake and alert, NAD  HEENT: EOMI, no scleral icterus, no injection of conjunctivae, moist mucus membranes  Neck: Supple, trachea midline  Respiratory: Breathing comfortably   Extremities: No cyanosis, clubbing, or edema   Skin: Bandage initially intact and removed temporarily. Open, healing, star shaped wound on left wrist from laceration. No signs of infection. No erythema. Mild amount of clear drainage from wound.  Neuro: CN II-XII intact, no focal deficits          Relevent information:     History reviewed. No pertinent past medical history.    No Known Allergies    No results found for this or any previous visit (from the past 24 hour(s)).         Assessment and Plan:     Nelida Montano is a 18 year old female with unspecified psychosis who has left forearm laceration that is healing appropriately. Patient afebrile. No signs of infection. Stitches not warranted at this time. Continue wound care with daily bandage changes and Bacitracin. Will consult wound care nurse for Monday to further evaluate and make any additional " recommendations. Non-urgent consult given that wound is healing as anticipated and there are no signs of infection.     =============================================================================  Deep Zamudio DO, MA  PGY-2 Psychiatry Resident  Pager: [phone]

## 2018-10-13 NOTE — PROGRESS NOTES
Pt kept to herself for all of shift. Pt spent majority of shift in room.  Pt came out to grab food and went right back to her room. Pt did not interact with any one during shift. Pt did not get any visitors during shift.     10/12/18 1614   Behavioral Health   Hallucinations denies / not responding to hallucinations   Thinking distractable;confused   Orientation person: oriented;place: oriented   Memory baseline memory   Insight poor   Judgement impaired   Eye Contact at examiner   Affect blunted, flat   Mood hopeless;depressed   Suicidality other (see comments)  (Non stated)   1. Wish to be Dead No   2. Non-Specific Active Suicidal Thoughts  No   Self Injury other (see comment)  (Non stated)   Activity withdrawn;isolative   Psychomotor / Gait balanced;steady   Activities of Daily Living   Hygiene/Grooming independent   Oral Hygiene independent   Dress independent   Laundry with supervision   Room Organization independent

## 2018-10-13 NOTE — PROGRESS NOTES
Changed dressing on left wrist was changed  twice today because dressing was wet. First old dressing had a large amount of serosanguinous drainage on it. Dressing was almost completely soaked with drainage. Patient said the second time dressing got wet. Wound edges continue to remain open but healing, there is no sign of infection. Writer asked Dr. Zamudio if IM should look at it. Dr. Zamudio came to look at the wound and ordered wound care nurse.

## 2018-10-13 NOTE — PROGRESS NOTES
"Pt was active in the milieu and attended groups. She reports feeling \"happy\" and \"not laying around like usual\". She denies SI, SIB, anxiety, depression, and hallucinations. Pt spent time riding the bicycle in the lounge and was interested in playing video games.        10/13/18 1346   Behavioral Health   Hallucinations denies / not responding to hallucinations   Thinking distractable   Orientation person: oriented;place: oriented   Memory baseline memory   Insight poor   Judgement impaired   Eye Contact at examiner   Affect blunted, flat   Mood mood is calm   Physical Appearance/Attire attire appropriate to age and situation   Hygiene other (see comment)  (adequate )   Suicidality other (see comments)  (pt denies )   1. Wish to be Dead No   2. Non-Specific Active Suicidal Thoughts  No   Self Injury other (see comment)  (pt denies )   Elopement (none stated or observed )   Activity other (see comment)  (active in the milieu and in groups )   Speech clear;coherent   Medication Sensitivity no stated side effects   Psychomotor / Gait balanced;steady   Activities of Daily Living   Hygiene/Grooming independent   Oral Hygiene independent   Dress independent   Room Organization independent   Behavioral Health Interventions   Depression maintain safety precautions;monitor need to revise level of observation;maintain safe secure environment;assist patient in developing safety plan;assist patient in following safety plan;encourage nutrition and hydration;encourage participation / independence with adls;provide emotional support;establish therapeutic relationship;assist with developing and utilizing healthy coping strategies;build upon strengths   Social and Therapeutic Interventions (Depression) encourage participation in therapeutic groups and milieu activities     "

## 2018-10-14 ENCOUNTER — APPOINTMENT (OUTPATIENT)
Dept: MRI IMAGING | Facility: CLINIC | Age: 18
End: 2018-10-14
Payer: COMMERCIAL

## 2018-10-14 PROCEDURE — 25000132 ZZH RX MED GY IP 250 OP 250 PS 637: Performed by: STUDENT IN AN ORGANIZED HEALTH CARE EDUCATION/TRAINING PROGRAM

## 2018-10-14 PROCEDURE — 12400007 ZZH R&B MH INTERMEDIATE UMMC

## 2018-10-14 PROCEDURE — 70551 MRI BRAIN STEM W/O DYE: CPT

## 2018-10-14 RX ADMIN — RISPERIDONE 2 MG: 2 TABLET ORAL at 19:05

## 2018-10-14 RX ADMIN — TRAZODONE HYDROCHLORIDE 50 MG: 50 TABLET ORAL at 21:18

## 2018-10-14 RX ADMIN — TRAZODONE HYDROCHLORIDE 50 MG: 50 TABLET ORAL at 19:05

## 2018-10-14 RX ADMIN — HYDROXYZINE HYDROCHLORIDE 25 MG: 25 TABLET ORAL at 21:18

## 2018-10-14 RX ADMIN — RISPERIDONE 1 MG: 1 TABLET ORAL at 08:25

## 2018-10-14 RX ADMIN — ESCITALOPRAM OXALATE 5 MG: 5 TABLET, FILM COATED ORAL at 08:24

## 2018-10-14 ASSESSMENT — ACTIVITIES OF DAILY LIVING (ADL)
DRESS: SCRUBS (BEHAVIORAL HEALTH)
GROOMING: INDEPENDENT
ORAL_HYGIENE: INDEPENDENT

## 2018-10-14 NOTE — PROGRESS NOTES
10/13/18 2022   Behavioral Health   Hallucinations other (see comment)  (sleeping)   Thinking other (see comment)  (sleeping)   Orientation person: oriented   Memory other (see comment)  (sleeping)   Insight other (see comment)  (sleeping)   Judgement (sleeping)   Eye Contact (sleeping)   Affect other (see comments)  (sleeping)   Mood other (see comments);mood is calm  (sleeping)   Physical Appearance/Attire other (see comment)  (appropriate for sleep)   Hygiene other (see comment)  (adequate)   Suicidality other (see comments)  (sleeping)   Self Injury other (see comment)  (sleeping)   Activity other (see comment)  (Meals)   Speech clear;coherent   Medication Sensitivity no stated side effects;other (see comment)  (sleeping most of shift)   Psychomotor / Gait steady;balanced     Pt. Sleeping. Pt. Out for meal.

## 2018-10-14 NOTE — PROGRESS NOTES
Pt out of room more , withdrawn , states she feels tired all the time , can sleep day and night , she feels it is from her medications . She denies si or sib, smiles , pleasant .  Pt had MRI this am.

## 2018-10-15 PROCEDURE — G0463 HOSPITAL OUTPT CLINIC VISIT: HCPCS | Mod: 25

## 2018-10-15 PROCEDURE — 25000132 ZZH RX MED GY IP 250 OP 250 PS 637: Performed by: STUDENT IN AN ORGANIZED HEALTH CARE EDUCATION/TRAINING PROGRAM

## 2018-10-15 PROCEDURE — 97127 ZZHC OT THERAPEUTIC INTERVENTION W/FOCUS ON COGNITIVE FUNCTION,EA 15 MIN: CPT | Mod: GO

## 2018-10-15 PROCEDURE — 12400007 ZZH R&B MH INTERMEDIATE UMMC

## 2018-10-15 PROCEDURE — 25000128 H RX IP 250 OP 636: Performed by: PSYCHIATRY & NEUROLOGY

## 2018-10-15 PROCEDURE — 99232 SBSQ HOSP IP/OBS MODERATE 35: CPT | Mod: GC | Performed by: PSYCHIATRY & NEUROLOGY

## 2018-10-15 PROCEDURE — 97602 WOUND(S) CARE NON-SELECTIVE: CPT

## 2018-10-15 PROCEDURE — 90686 IIV4 VACC NO PRSV 0.5 ML IM: CPT | Performed by: PSYCHIATRY & NEUROLOGY

## 2018-10-15 RX ORDER — ESCITALOPRAM OXALATE 10 MG/1
10 TABLET ORAL DAILY
Status: DISCONTINUED | OUTPATIENT
Start: 2018-10-16 | End: 2018-10-17

## 2018-10-15 RX ADMIN — ESCITALOPRAM OXALATE 5 MG: 5 TABLET, FILM COATED ORAL at 08:48

## 2018-10-15 RX ADMIN — INFLUENZA A VIRUS A/MICHIGAN/45/2015 X-275 (H1N1) ANTIGEN (FORMALDEHYDE INACTIVATED), INFLUENZA A VIRUS A/SINGAPORE/INFIMH-16-0019/2016 IVR-186 (H3N2) ANTIGEN (FORMALDEHYDE INACTIVATED), INFLUENZA B VIRUS B/PHUKET/3073/2013 ANTIGEN (FORMALDEHYDE INACTIVATED), AND INFLUENZA B VIRUS B/MARYLAND/15/2016 BX-69A ANTIGEN (FORMALDEHYDE INACTIVATED) 0.5 ML: 15; 15; 15; 15 INJECTION, SUSPENSION INTRAMUSCULAR at 14:27

## 2018-10-15 RX ADMIN — HYDROXYZINE HYDROCHLORIDE 25 MG: 25 TABLET ORAL at 21:06

## 2018-10-15 RX ADMIN — RISPERIDONE 1 MG: 1 TABLET ORAL at 08:48

## 2018-10-15 RX ADMIN — TRAZODONE HYDROCHLORIDE 50 MG: 50 TABLET ORAL at 21:06

## 2018-10-15 RX ADMIN — RISPERIDONE 2 MG: 2 TABLET ORAL at 21:06

## 2018-10-15 ASSESSMENT — ACTIVITIES OF DAILY LIVING (ADL)
DRESS: INDEPENDENT
GROOMING: INDEPENDENT
GROOMING: INDEPENDENT
ORAL_HYGIENE: INDEPENDENT
ORAL_HYGIENE: INDEPENDENT
LAUNDRY: WITH SUPERVISION
DRESS: INDEPENDENT
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
## 2018-10-15 NOTE — PROGRESS NOTES
RE - Legal - petition for commitment (MI) with Green request - [facility]     patient is on court hold       Tuesday October 16, 2018 - 10:30am - Preliminary Hearing     Wednesday October 24, 2018 - 1:30pm - Commitment Hearing and Green Hearing     Above hearing dates scheduled currently with [facility] court.

## 2018-10-15 NOTE — PLAN OF CARE
Problem: Occupational Therapy Goals (Adult)  Goal: Occupational Therapy Goals  Stand Alone Therapy Goal    Pt attended 1 out of 3 OT groups offered. Pt actively participated in occupational therapy clinic. Pt was able to ask for assistance with prompting, and independently initiated a self-selected creative expression task. Pt demonstrated good focus, planning, and attention to detail. Pt mostly kept to herself throughout this group unless conversation was initiated with her. She initially presented with a flat affect, though was observed smiling to herself at times (possibly internally preoccupied). Appeared to brighten while listening to selected music, as peers complimented her music choice. Pleasant and polite on approach. Will continue to assess. Pt was given and completed a self-assessment form, and OT staff explained the purpose of including them in their treatment plan and offer options for meeting their needs. More observation needed to complete initial assessment at this time.

## 2018-10-15 NOTE — PROGRESS NOTES
"   10/15/18 [facility]– [place] 2]   Behavioral Health   Hallucinations denies / not responding to hallucinations   Thinking poor concentration   Orientation person: oriented;place: oriented   Memory baseline memory   Insight poor   Judgement impaired   Eye Contact at examiner   Affect blunted, flat   Mood mood is calm   Physical Appearance/Attire appears stated age   Hygiene well groomed   Suicidality other (see comments)  (denies)   1. Wish to be Dead No   2. Non-Specific Active Suicidal Thoughts  No   Self Injury other (see comment)  (denies)   Elopement (none stated or observed)   Activity isolative;other (see comment)  (Present)   Speech clear;coherent   Medication Sensitivity no stated side effects;no observed side effects   Psychomotor / Gait balanced;steady   Safety   Elopement status 15;no shoes;room away from unit doors;behavioral scrubs (pajamas);signs posted on unit entrance / exit doors;engagement in unit activities   Activities of Daily Living   Hygiene/Grooming independent   Oral Hygiene independent   Dress independent   Room Organization independent   Behavioral Health Interventions   Depression maintain safety precautions;maintain safe secure environment;assist patient in following safety plan;encourage nutrition and hydration;encourage participation / independence with adls;provide emotional support;establish therapeutic relationship;assist with developing and utilizing healthy coping strategies;build upon strengths   Social and Therapeutic Interventions (Depression) encourage socialization with peers;encourage effective boundaries with peers;encourage participation in therapeutic groups and milieu activities     Pt was calm, approachable, and cooperative. Pt was visible in the milieu and common areas. Pt appeared to have a blunt/flat affect, Pt did not brighten upon approach. Pt reported feeling \"tired\" and stated mood as \"good\" - consistent throughout the day. Pt attended and participated in the first morning " "group - Pt did not attend afternoon groups due to feeling tired. Pt stated she's \"just going through the motions today\". Pt was observed resting in bed. Pt denied SI and SIB. Pt denied hallucinations/psychotic symptoms.     "

## 2018-10-15 NOTE — PLAN OF CARE
"Problem: Mood Impairment (Depressive Signs/Symptoms) (Adult)  Goal: Improved Mood Symptoms (Depressive Signs/Symptoms)  Pt alert and oriented. Up independently with steady gait. Pt denies SI/SIB, depression, anxiety and hallucinations. States she ate  well for supper.  States she is in a bad mood because she really does not want to be here.  Verbalized she slept fairly well last night and wants to go to bed early tonight saying \"all I want to do is sleep\". Denies any problems with her medications.       "
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
## 2018-10-16 PROCEDURE — 25000132 ZZH RX MED GY IP 250 OP 250 PS 637: Performed by: STUDENT IN AN ORGANIZED HEALTH CARE EDUCATION/TRAINING PROGRAM

## 2018-10-16 PROCEDURE — 97127 ZZHC OT THERAPEUTIC INTERVENTION W/FOCUS ON COGNITIVE FUNCTION,EA 15 MIN: CPT | Mod: GO

## 2018-10-16 PROCEDURE — 12400007 ZZH R&B MH INTERMEDIATE UMMC

## 2018-10-16 RX ADMIN — RISPERIDONE 3 MG: 1 TABLET ORAL at 20:25

## 2018-10-16 RX ADMIN — TRAZODONE HYDROCHLORIDE 50 MG: 50 TABLET ORAL at 20:26

## 2018-10-16 RX ADMIN — ESCITALOPRAM OXALATE 10 MG: 10 TABLET ORAL at 08:39

## 2018-10-16 ASSESSMENT — ACTIVITIES OF DAILY LIVING (ADL)
LAUNDRY: WITH SUPERVISION
DRESS: INDEPENDENT;SCRUBS (BEHAVIORAL HEALTH)
GROOMING: INDEPENDENT
ORAL_HYGIENE: INDEPENDENT
ORAL_HYGIENE: INDEPENDENT
DRESS: INDEPENDENT
GROOMING: INDEPENDENT

## 2018-10-16 NOTE — PROGRESS NOTES
Pt had an okay shift. Pt spent majority of shift  in her room. Pt was isolated and did talk to peers during shift. Pt requested the video game to play by herself. Pt was respectful to staff. Pt did not attend groups during shift. Pt did not have any visitors during shift. Pt did not need to be put in restraints or seclusion during shift.     10/15/18 2121   Behavioral Health   Hallucinations denies / not responding to hallucinations   Thinking distractable;poor concentration   Orientation person: oriented;place: oriented   Memory baseline memory   Insight poor   Judgement impaired   Eye Contact at examiner   Affect blunted, flat   Mood mood is calm   Hygiene well groomed   Suicidality other (see comments)  (Non stated)   1. Wish to be Dead No   2. Non-Specific Active Suicidal Thoughts  No   Self Injury other (see comment)  (Non stated)   Activity withdrawn;isolative   Speech clear;coherent   Psychomotor / Gait balanced   Activities of Daily Living   Hygiene/Grooming independent   Oral Hygiene independent   Dress independent   Laundry with supervision   Room Organization independent

## 2018-10-16 NOTE — PLAN OF CARE
Problem: Mood Impairment (Depressive Signs/Symptoms) (Adult)  Goal: Improved Mood Symptoms (Depressive Signs/Symptoms)  Outcome: Improving  Patient isolative to room early in the shift. She went to court for her initial hearing. Patient returned to unit stating it went well. She attended afternoon group. Later during interaction patient smiled. When staff commented that it was so good to see her smile, she smiled more. Affirmed that she is feeling a small amount improved. She is med compliant. Denies SI and SIB. Nicki Hamlin RN'

## 2018-10-16 NOTE — PROGRESS NOTES
"INITIAL O.T. ASSESSMENT:  Nelida has attended OT occasionally since admission. Affect very flat, restricted. Movements slow and tentative. Polite, occasionally brightens when directly engaged in conversation.      Was given and completed a written self assessment. Cited \"trying to take my life\" as the reason for current hospitalization. When asked what staff can do to be most helpful during hospitalization, pt responded \"I'm not sure.\"    Goals prioritized for hospitalization (selected from list): Self-care, Sleep quality, Personal safety, Self-compassion, Focus & concentration, Self-advocacy    Goals prioritized for hospitalization (self-described): Trying to make my life better.     OT staff explained the purpose of pt being involved in current treatment plan.      Plan: Encourage ongoing attendance as able to meet self-stated goals, implement positive coping skills, engage in graded opportunities for success, and provide assessment ongoing.       10/16/18 1400   General Information   Date Initially Attended OT 10/13/18   Clinical Impression   Affect Flat   Orientation Needs further assessment   Appearance and ADLs General cleanliness observed in most areas   Attention to Internal Stimuli Needs further assessment   Interaction Skills Interacts appropriately with staff;Interacts appropriately with peers   Ability to Communicate Needs Does so with prompts   Verbal Content Needs further assessment   Ability to Maintain Boundaries Maintains appropriate physical boundaries;Maintains appropriate verbal boundaries   Participation Participates with frequent encouragement   Concentration Concentrates 10-20 minutes;Needs further assessment   Ability to Concentrate With structure;Preoccupied   Follows and Comprehends Directions Needs further assessment   Memory Needs further assessment   Organization Independently organizes simple tasks;Needs occasional assistance    Decision Making Needs further assessment   Planning and " Problem Solving Occasionally needs assist/feedback   Ability to Apply and Learn Concepts Comprehends concepts, but needs assist to apply   Frustrations / Stress Tolerance Utilizes coping skills with prompts   Level of Insight Some insight   Self Esteem Takes risks with support and encouragement   Social Supports Has knowledge of support systems

## 2018-10-17 PROCEDURE — 25000132 ZZH RX MED GY IP 250 OP 250 PS 637: Performed by: STUDENT IN AN ORGANIZED HEALTH CARE EDUCATION/TRAINING PROGRAM

## 2018-10-17 PROCEDURE — 93005 ELECTROCARDIOGRAM TRACING: CPT

## 2018-10-17 PROCEDURE — G0177 OPPS/PHP; TRAIN & EDUC SERV: HCPCS

## 2018-10-17 PROCEDURE — 93010 ELECTROCARDIOGRAM REPORT: CPT | Performed by: INTERNAL MEDICINE

## 2018-10-17 PROCEDURE — 99232 SBSQ HOSP IP/OBS MODERATE 35: CPT | Mod: GC | Performed by: PSYCHIATRY & NEUROLOGY

## 2018-10-17 PROCEDURE — 12400007 ZZH R&B MH INTERMEDIATE UMMC

## 2018-10-17 RX ADMIN — TRAZODONE HYDROCHLORIDE 50 MG: 50 TABLET ORAL at 22:37

## 2018-10-17 RX ADMIN — ESCITALOPRAM OXALATE 10 MG: 10 TABLET ORAL at 08:45

## 2018-10-17 RX ADMIN — TRAZODONE HYDROCHLORIDE 50 MG: 50 TABLET ORAL at 20:51

## 2018-10-17 RX ADMIN — RISPERIDONE 3 MG: 1 TABLET ORAL at 20:52

## 2018-10-17 NOTE — PROGRESS NOTES
CLINICAL NUTRITION SERVICES - REASSESSMENT NOTE     Nutrition Prescription    RECOMMENDATIONS FOR MDs/PROVIDERS TO ORDER:  None    Malnutrition Status:    Patient does not meet criteria    Recommendations already ordered by Registered Dietitian (RD):  Discontinue Breeze    Future/Additional Recommendations:  None     EVALUATION OF THE PROGRESS TOWARD GOALS   Diet: Regular, Boost Breeze with meals  Intake: Patient is ordering well for TID meals. She does not like the Breeze but reports she is eating well for meals. RN reports no concerns regarding pt's nutritional intakes.      NEW FINDINGS   - Weight has trended up 1.5 lbs since admission.    MALNUTRITION  % Intake: No decreased intake noted  % Weight Loss: Weight loss does not meet criteria (weight now trending up)  Subcutaneous Fat Loss: None observed  Muscle Loss: None observed  Fluid Accumulation/Edema: None noted  Malnutrition Diagnosis: Patient does not meet two of the above criteria necessary for diagnosing malnutrition    Previous Goals   1. Patient to consume % of nutritionally adequate meal trays TID, or the equivalent with supplements/snacks.  2. Weight maintenance of at least 61 kg.   Evaluation: Met    Previous Nutrition Diagnosis  Inadequate oral intake related to decreased appetite with mental health status as evidenced by severe weight loss hx of 10% in less than 6 weeks, per family report not eating or drinking.   Evaluation: Resolved    CURRENT NUTRITION DIAGNOSIS  No nutrition diagnosis at this time.    INTERVENTIONS  Implementation  Collaboration with other providers  Medical food supplement therapy    Monitoring/Evaluation  No need for additional follow up indicate.      Chanda Christianson, MS, RD, LD

## 2018-10-17 NOTE — PROGRESS NOTES
Pt had a good shift.  PT was present in milieu at times during shift and was calm and pleasant with peers and staff.  Pt ate dinner and snack with peers and at this time has no additional questions or concerns at this time.         10/16/18 2127   Behavioral Health   Hallucinations denies / not responding to hallucinations   Thinking distractable;poor concentration   Orientation person: oriented;place: oriented;date: oriented;time: oriented   Memory baseline memory   Insight poor   Judgement impaired   Eye Contact at examiner   Affect blunted, flat   Mood mood is calm   Physical Appearance/Attire disheveled   Hygiene neglected grooming - unclean body, hair, teeth   Suicidality other (see comments)  (denies)   Self Injury thoughts only   Activity isolative;withdrawn   Speech clear;coherent   Medication Sensitivity no observed side effects;no stated side effects   Psychomotor / Gait balanced;steady   Psycho Education   Type of Intervention 1:1 intervention   Response participates with encouragement   Hours 0.5   Treatment Detail check in   Activities of Daily Living   Hygiene/Grooming independent   Oral Hygiene independent   Dress independent;scrubs (behavioral health)   Room Organization independent   Activity   Activity Assistance Provided independent

## 2018-10-17 NOTE — PROGRESS NOTES
10/17/18 1339   Behavioral Health   Hallucinations denies / not responding to hallucinations   Thinking poor concentration   Orientation person: oriented;place: oriented;date: oriented   Insight poor   Judgement impaired   Eye Contact at examiner   Affect (neutral)   Mood mood is calm   Physical Appearance/Attire attire appropriate to age and situation   Hygiene well groomed   Suicidality other (see comments)  (denies at this time)   1. Wish to be Dead No   2. Non-Specific Active Suicidal Thoughts  No   Self Injury other (see comment)  (None observed or reported)   Elopement (None observed)   Activity withdrawn   Speech coherent   Medication Sensitivity no stated side effects   Psychomotor / Gait balanced     Pt has been in and out of her room today. Affect has been neutral. Calm and controlled behavior. Observed to attend groups, took a shower, and organized her bed. Mostly keeps to herself, but she is approachable for conversations. Pleasant and polite. Denies hearing voices. Writer observed pt laughing to herself as she paces on the halls. Denies thoughts to hurt herself or others. Contracts for safety.
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
## 2018-10-17 NOTE — PLAN OF CARE
Problem: Occupational Therapy Goals (Adult)  Goal: Occupational Therapy Goals  Stand Alone Therapy Goal     Pt attended 1 out of 3 OT groups offered. Pt actively participated in occupational therapy clinic. Pt was able to ask for assistance as needed, and independently returned to a creative expression task. Pt demonstrated good focus, planning, problem solving, and attention to detail. Organized in her task approach. Minimal interaction with peers observed. Pleasant on approach.

## 2018-10-18 LAB — INTERPRETATION ECG - MUSE: NORMAL

## 2018-10-18 PROCEDURE — 12400007 ZZH R&B MH INTERMEDIATE UMMC

## 2018-10-18 PROCEDURE — 99232 SBSQ HOSP IP/OBS MODERATE 35: CPT | Mod: GC | Performed by: PSYCHIATRY & NEUROLOGY

## 2018-10-18 PROCEDURE — 25000132 ZZH RX MED GY IP 250 OP 250 PS 637: Performed by: STUDENT IN AN ORGANIZED HEALTH CARE EDUCATION/TRAINING PROGRAM

## 2018-10-18 PROCEDURE — G0177 OPPS/PHP; TRAIN & EDUC SERV: HCPCS

## 2018-10-18 RX ORDER — TRAZODONE HYDROCHLORIDE 100 MG/1
100 TABLET ORAL
Status: DISCONTINUED | OUTPATIENT
Start: 2018-10-18 | End: 2018-10-25

## 2018-10-18 RX ADMIN — TRAZODONE HYDROCHLORIDE 100 MG: 100 TABLET ORAL at 19:04

## 2018-10-18 RX ADMIN — RISPERIDONE 3 MG: 1 TABLET ORAL at 19:03

## 2018-10-18 ASSESSMENT — ACTIVITIES OF DAILY LIVING (ADL)
GROOMING: INDEPENDENT
LAUNDRY: WITH SUPERVISION
DRESS: INDEPENDENT
ORAL_HYGIENE: INDEPENDENT

## 2018-10-18 NOTE — PROGRESS NOTES
"Patient was in periphery of milieu much of the shift.  Patient attended group activities.  Patient did spend some time resting in bed.  Patient states she feels \"good\", \"calm and relaxed\" and hopes for discharge soon.  Patient denies any mental health concerns.  Patient denies SI/SIB.     10/18/18 1508   Behavioral Health   Hallucinations denies / not responding to hallucinations   Thinking poor concentration   Orientation person: oriented;place: oriented;date: oriented   Memory baseline memory   Insight poor   Judgement impaired   Eye Contact at examiner   Affect full range affect   Mood mood is calm   Physical Appearance/Attire attire appropriate to age and situation   Hygiene well groomed   Suicidality other (see comments)  (denies)   1. Wish to be Dead No   2. Non-Specific Active Suicidal Thoughts  No   Self Injury other (see comment)  (denies)   Activity withdrawn   Speech clear;coherent   Medication Sensitivity no stated side effects   Psychomotor / Gait balanced;steady   Psycho Education   Type of Intervention 1:1 intervention   Response participates, initiates socially appropriate   Hours 0.5   Treatment Detail check-in   Behavioral Health Interventions   Depression maintain safe secure environment   Social and Therapeutic Interventions (Depression) encourage socialization with peers        10/18/18 1508   Behavioral Health   Hallucinations denies / not responding to hallucinations   Thinking poor concentration   Orientation person: oriented;place: oriented;date: oriented   Memory baseline memory   Insight poor   Judgement impaired   Eye Contact at examiner   Affect full range affect   Mood mood is calm   Physical Appearance/Attire attire appropriate to age and situation   Hygiene well groomed   Suicidality other (see comments)  (denies)   1. Wish to be Dead No   2. Non-Specific Active Suicidal Thoughts  No   Self Injury other (see comment)  (denies)   Activity withdrawn   Speech clear;coherent   Medication " Sensitivity no stated side effects   Psychomotor / Gait balanced;steady   Psycho Education   Type of Intervention 1:1 intervention   Response participates, initiates socially appropriate   Hours 0.5   Treatment Detail check-in   Behavioral Health Interventions   Depression maintain safe secure environment   Social and Therapeutic Interventions (Depression) encourage socialization with peers
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
## 2018-10-18 NOTE — PROGRESS NOTES
Pt was observed in the milieu walking the halls, for meals, and part of community meeting. Pt participated socially appropriately in the beginning of the meeting but did not stay for the entirety of the meeting. Pt was observed walking the halls often smiling and laughing to themselves. Pt did socialize with some peers while walking. Pt expressed that they want to leave and are feeling better. Pt denied experiencing hallucinations as well as denied SI and SIB.     10/17/18 2037   Behavioral Health   Hallucinations denies / not responding to hallucinations   Thinking poor concentration   Orientation person: oriented;place: oriented;time: oriented   Memory baseline memory   Insight poor   Judgement impaired   Eye Contact at examiner   Affect full range affect   Mood mood is calm   Physical Appearance/Attire attire appropriate to age and situation   Hygiene well groomed   Suicidality (denies)   1. Wish to be Dead No   2. Non-Specific Active Suicidal Thoughts  No   Psycho Education   Type of Intervention 1:1 intervention   Response participates with encouragement   Hours 0.5   Treatment Detail (Check-in)

## 2018-10-18 NOTE — PLAN OF CARE
Problem: Occupational Therapy Goals (Adult)  Goal: Occupational Therapy Goals  Stand Alone Therapy Goal   Nelida  attended 1 of 2 OT groups today. Very quiet, affect restricted. Minimal verbal participation during session, but contributed to the cooperative group project by executing simple structured tasks (making homemade pizza).

## 2018-10-19 PROCEDURE — 99232 SBSQ HOSP IP/OBS MODERATE 35: CPT | Mod: GC | Performed by: PSYCHIATRY & NEUROLOGY

## 2018-10-19 PROCEDURE — 25000132 ZZH RX MED GY IP 250 OP 250 PS 637: Performed by: STUDENT IN AN ORGANIZED HEALTH CARE EDUCATION/TRAINING PROGRAM

## 2018-10-19 PROCEDURE — G0177 OPPS/PHP; TRAIN & EDUC SERV: HCPCS

## 2018-10-19 PROCEDURE — 12400007 ZZH R&B MH INTERMEDIATE UMMC

## 2018-10-19 RX ADMIN — TRAZODONE HYDROCHLORIDE 100 MG: 100 TABLET ORAL at 22:08

## 2018-10-19 RX ADMIN — RISPERIDONE 3 MG: 1 TABLET ORAL at 22:08

## 2018-10-19 ASSESSMENT — ACTIVITIES OF DAILY LIVING (ADL)
LAUNDRY: WITH SUPERVISION
DRESS: INDEPENDENT
LAUNDRY: WITH SUPERVISION
GROOMING: INDEPENDENT
DRESS: INDEPENDENT
GROOMING: INDEPENDENT
ORAL_HYGIENE: INDEPENDENT
ORAL_HYGIENE: INDEPENDENT
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
## 2018-10-19 NOTE — PROGRESS NOTES
This writer met with patient per her request - patient asked for help with applying for social security. This writer discussed with her that such applications typically occur out patient and this writer will communicate with  when she assigned to one.     This writer attempted to discuss potential post hospital / discharge follow up options including topic of programs such as IRTS. patient states she is only interested in returning home to live with family.

## 2018-10-19 NOTE — PLAN OF CARE
Problem: Mood Impairment (Depressive Signs/Symptoms) (Adult)  Goal: Improved Mood Symptoms (Depressive Signs/Symptoms)  Outcome: Improving  Patient stays in room much of shift. Does come out to walk the gore and for meals. Minimally interacts with staff and peers. She does smile at times. Her mood appeared depressed and affect flat. She is med compliant. Denies SI and SIB. Nicki Hamlin RN

## 2018-10-19 NOTE — PROGRESS NOTES
10/19/18 1500   Behavioral Health   Hallucinations denies / not responding to hallucinations   Thinking distractable;poor concentration   Orientation person: oriented;place: oriented;date: oriented;time: oriented   Memory baseline memory   Insight poor   Judgement impaired   Eye Contact at examiner   Affect blunted, flat   Mood mood is calm   Physical Appearance/Attire attire appropriate to age and situation   Hygiene well groomed   Suicidality (Denies)   1. Wish to be Dead No   2. Non-Specific Active Suicidal Thoughts  No   Non-Specific Active Suicidal Thought Description '   Self Injury (None observed/stated)   Elopement (None observed/stated)   Activity withdrawn   Speech clear;coherent   Medication Sensitivity no stated side effects;no observed side effects   Psychomotor / Gait balanced;steady   Activities of Daily Living   Hygiene/Grooming independent   Oral Hygiene independent   Dress independent   Laundry with supervision   Room Organization independent   Behavioral Health Interventions   Depression maintain safety precautions;monitor need to revise level of observation;maintain safe secure environment;assist patient in developing safety plan;assist patient in following safety plan;encourage nutrition and hydration;encourage participation / independence with adls;provide emotional support;establish therapeutic relationship;assist with developing and utilizing healthy coping strategies;build upon strengths   Social and Therapeutic Interventions (Depression) encourage socialization with peers;encourage effective boundaries with peers;encourage participation in therapeutic groups and milieu activities     Pt has been visible some out on milieu. Mood has been calm; flat affect. While out, pt appears withdrawn.Pt showered this morning.  She has not been observed to respond to any internal stimuli this shift. Pt denies any SI/SIB/AH/VH/dpression or anxiety.

## 2018-10-19 NOTE — PLAN OF CARE
Problem: Patient Care Overview  Goal: Team Discussion  Team Plan:    BEHAVIORAL TEAM DISCUSSION    Participants:   Nghia Duncan MD Attending Psychiatrist   Manuel Griggs MD PGY1  Dolly Greco, MSW, [facility] Clinical Treatment Coordinator  MARTHA Disla, MS4   Nina Epstein, MS3  Progress: progress towards goal functioning is fair  Continued Stay Criteria/Rationale: symptoms of psychosis persist. patient is on court hold - final commitment and moore hearing to be held next week.   Medical/Physical: see psychiatry progress note.   Precautions:   Behavioral Orders   Procedures     Code 2     Routine Programming     As clinically indicated     Self Injury Precaution     Status 15     Every 15 minutes.     Suicide precautions     Patients on Suicide Precautions should have a Combination Diet ordered that includes a Diet selection(s) AND a Behavioral Tray selection for Safe Tray - with utensils, or Safe Tray - NO utensils       Plan: continue with medication management and monitoring - court hold continues.   Rationale for change in precautions or plan: per ongoing assessment

## 2018-10-20 PROCEDURE — 12400007 ZZH R&B MH INTERMEDIATE UMMC

## 2018-10-20 PROCEDURE — 25000132 ZZH RX MED GY IP 250 OP 250 PS 637: Performed by: STUDENT IN AN ORGANIZED HEALTH CARE EDUCATION/TRAINING PROGRAM

## 2018-10-20 RX ADMIN — TRAZODONE HYDROCHLORIDE 100 MG: 100 TABLET ORAL at 19:52

## 2018-10-20 RX ADMIN — RISPERIDONE 3 MG: 1 TABLET ORAL at 19:52

## 2018-10-20 ASSESSMENT — ACTIVITIES OF DAILY LIVING (ADL)
GROOMING: INDEPENDENT
ORAL_HYGIENE: INDEPENDENT
LAUNDRY: WITH SUPERVISION
DRESS: INDEPENDENT
ORAL_HYGIENE: INDEPENDENT
LAUNDRY: WITH SUPERVISION
DRESS: INDEPENDENT
GROOMING: INDEPENDENT

## 2018-10-20 NOTE — PROGRESS NOTES
Wrist wound is healing well.  Unattached edges at this time.  No signs/symptoms of infection.  Pt denies any pain at site of wound.  Will continue to clean and redress following orders.

## 2018-10-20 NOTE — PLAN OF CARE
Problem: Occupational Therapy Goals (Adult)  Goal: Occupational Therapy Goals  Stand Alone Therapy Goal     Attended OT group and Participated in an activity focused on identifying helpful ideas for calming using the 5 senses, and practicing a grounding technique with this focus. She appeared actively engaged, asked questions, initiated adding elaboration on comments and was supportive of peers. Eye contact was direct. Answers were resourceful. Pleasant with affect brightening with interactions. Supportive of peers.

## 2018-10-20 NOTE — PROGRESS NOTES
10/19/18 2506   Behavioral Health   Thoughts/Cognition (WDL) WDL   Hallucinations appears responding   Thinking poor concentration   Orientation person: oriented;place: oriented;date: oriented;time: oriented   Memory baseline memory   Insight poor   Judgement impaired   Eye Contact out of corner of eyes;at examiner   Affect/Mood (WDL) WDL   Affect blunted, flat   Mood mood is calm   ADL Assessment (WDL) WDL   Physical Appearance/Attire attire appropriate to age and situation   Hygiene other (see comment)  (patient did not shower during this shift)   Suicidality (WDL) WDL   Suicidality other (see comments)  (patient denies)   1. Wish to be Dead No   2. Non-Specific Active Suicidal Thoughts  No   Activities of Daily Living   Hygiene/Grooming independent   Oral Hygiene independent   Dress independent   Laundry with supervision   Room Organization independent       Patient was calm for the entirety of the shift. Patient listened to music on headphones and walked to gore. Patient was seen responding and laughing to herself. Patient later watched a movie on the DVD TV. Patient was not social with staff or other patients while out in the milieu. Patient spent the rest of the evening in her room and went to bed early. Patient denies SI/SIB. Patient has no further concerns at this time.

## 2018-10-21 PROCEDURE — 12400007 ZZH R&B MH INTERMEDIATE UMMC

## 2018-10-21 PROCEDURE — 25000132 ZZH RX MED GY IP 250 OP 250 PS 637: Performed by: STUDENT IN AN ORGANIZED HEALTH CARE EDUCATION/TRAINING PROGRAM

## 2018-10-21 RX ADMIN — NICOTINE POLACRILEX 4 MG: 2 LOZENGE ORAL at 10:41

## 2018-10-21 RX ADMIN — OLANZAPINE 10 MG: 10 TABLET, FILM COATED ORAL at 20:02

## 2018-10-21 RX ADMIN — RISPERIDONE 3 MG: 1 TABLET ORAL at 20:01

## 2018-10-21 RX ADMIN — TRAZODONE HYDROCHLORIDE 100 MG: 100 TABLET ORAL at 20:02

## 2018-10-21 ASSESSMENT — ACTIVITIES OF DAILY LIVING (ADL)
GROOMING: INDEPENDENT
ORAL_HYGIENE: INDEPENDENT
DRESS: INDEPENDENT
HYGIENE/GROOMING: INDEPENDENT
LAUNDRY: WITH SUPERVISION

## 2018-10-21 NOTE — PLAN OF CARE
Problem: Mood Impairment (Depressive Signs/Symptoms) (Adult)  Goal: Improved Mood Symptoms (Depressive Signs/Symptoms)  Outcome: Improving  Patient spends time out in milieu. She is becoming more social with peers. Mom visited this evening. Appeared to enjoy that. Patient having some spontaneous smiles. Mood is a little brighter. Continues to be easily distracted. She does not appear to be responding to internal stimuli. She is med compliant. Denies SI and SIB. Nicki Hamlin RN

## 2018-10-21 NOTE — PROGRESS NOTES
10/21/18 1716   Behavioral Health   Hallucinations auditory   Thinking distractable;poor concentration   Orientation person: oriented;place: oriented;date: oriented;time: oriented   Memory baseline memory   Insight poor   Judgement impaired   Eye Contact at examiner   Affect blunted, flat   Mood mood is calm   Physical Appearance/Attire attire appropriate to age and situation   Hygiene well groomed   Suicidality other (see comments)  (Denied)   1. Wish to be Dead No   2. Non-Specific Active Suicidal Thoughts  No   Self Injury other (see comment)  (Denied)   Elopement (None observed)   Activity withdrawn;other (see comment)  (Partially attended groups)   Speech clear;coherent   Medication Sensitivity no stated side effects   Psychomotor / Gait balanced;steady   Psycho Education   Type of Intervention 1:1 intervention   Response participates, initiates socially appropriate   Hours 0.5   Treatment Detail Check-In   Activities of Daily Living   Hygiene/Grooming independent   Oral Hygiene independent   Dress independent   Laundry with supervision   Room Organization independent   Behavioral Health Interventions   Depression build upon strengths;assist with developing and utilizing healthy coping strategies;establish therapeutic relationship;provide emotional support;maintain safe secure environment   Social and Therapeutic Interventions (Depression) encourage participation in therapeutic groups and milieu activities;encourage socialization with peers     Pt was visible in the milieu for the majority of the shift. While out in the milieu, pt spent a great deal of time walking the gore. Pt partially attended and participated in community meeting and focus group. Pt stated she could not concentrate and sit still for the groups due partly to the voices. Moreover, pt expressed feeling bored and tired. Pt denied any other mental health symptoms.

## 2018-10-22 PROCEDURE — 25000132 ZZH RX MED GY IP 250 OP 250 PS 637: Performed by: STUDENT IN AN ORGANIZED HEALTH CARE EDUCATION/TRAINING PROGRAM

## 2018-10-22 PROCEDURE — H2032 ACTIVITY THERAPY, PER 15 MIN: HCPCS

## 2018-10-22 PROCEDURE — 99232 SBSQ HOSP IP/OBS MODERATE 35: CPT | Mod: GC | Performed by: PSYCHIATRY & NEUROLOGY

## 2018-10-22 PROCEDURE — G0177 OPPS/PHP; TRAIN & EDUC SERV: HCPCS

## 2018-10-22 PROCEDURE — 12400007 ZZH R&B MH INTERMEDIATE UMMC

## 2018-10-22 RX ORDER — RISPERIDONE 2 MG/1
4 TABLET ORAL AT BEDTIME
Status: DISCONTINUED | OUTPATIENT
Start: 2018-10-22 | End: 2018-11-02 | Stop reason: HOSPADM

## 2018-10-22 RX ADMIN — RISPERIDONE 4 MG: 2 TABLET ORAL at 22:02

## 2018-10-22 RX ADMIN — TRAZODONE HYDROCHLORIDE 100 MG: 100 TABLET ORAL at 22:02

## 2018-10-22 ASSESSMENT — ACTIVITIES OF DAILY LIVING (ADL)
DRESS: STREET CLOTHES;INDEPENDENT
LAUNDRY: WITH SUPERVISION
ORAL_HYGIENE: INDEPENDENT
DRESS: STREET CLOTHES;SCRUBS (BEHAVIORAL HEALTH)
ORAL_HYGIENE: INDEPENDENT
GROOMING: INDEPENDENT
GROOMING: INDEPENDENT
LAUNDRY: UNABLE TO COMPLETE
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
## 2018-10-22 NOTE — PROGRESS NOTES
Pt presents with blunted affect. She is generally isolative to self in room. Pt is visible in milieu for meals and attends groups. Pt states no further concerns to this writer.      10/21/18 2220   Behavioral Health   Hallucinations appears responding   Thinking distractable   Insight insight appropriate to situation   Judgement impaired   Eye Contact out of corner of eyes   Affect blunted, flat   Mood mood is calm   Physical Appearance/Attire attire appropriate to age and situation   Hygiene well groomed   Activity withdrawn   Speech coherent   Psycho Education   Type of Intervention structured groups   Response participates, initiates socially appropriate   Group Therapy Session   Group Attendance attended group session   Group Type community   Activities of Daily Living   Hygiene/Grooming independent

## 2018-10-22 NOTE — PLAN OF CARE
Problem: Occupational Therapy Goals (Adult)  Goal: Occupational Therapy Goals  Stand Alone Therapy Goal   Nelida  attended 3 of 3 OT groups today.  Very quiet and focused on her creative projects (beaded bracelets). Did not initiate comments but answers all questions with coherent, thoughtful responses. Affect generally flat, but brightens on occasion.

## 2018-10-23 PROCEDURE — 25000132 ZZH RX MED GY IP 250 OP 250 PS 637: Performed by: STUDENT IN AN ORGANIZED HEALTH CARE EDUCATION/TRAINING PROGRAM

## 2018-10-23 PROCEDURE — 12400007 ZZH R&B MH INTERMEDIATE UMMC

## 2018-10-23 PROCEDURE — 99232 SBSQ HOSP IP/OBS MODERATE 35: CPT | Mod: GC | Performed by: PSYCHIATRY & NEUROLOGY

## 2018-10-23 PROCEDURE — G0177 OPPS/PHP; TRAIN & EDUC SERV: HCPCS

## 2018-10-23 RX ADMIN — OLANZAPINE 10 MG: 10 TABLET, FILM COATED ORAL at 19:34

## 2018-10-23 RX ADMIN — RISPERIDONE 4 MG: 2 TABLET ORAL at 17:30

## 2018-10-23 RX ADMIN — TRAZODONE HYDROCHLORIDE 100 MG: 100 TABLET ORAL at 19:34

## 2018-10-23 ASSESSMENT — ACTIVITIES OF DAILY LIVING (ADL)
ORAL_HYGIENE: INDEPENDENT
DRESS: INDEPENDENT
GROOMING: INDEPENDENT
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
## 2018-10-23 NOTE — PROGRESS NOTES
Pt was observed walking the halls and playing games in the quiet zone.Throughout the remainder of the evening pt was observed being isolative to their bedroom and went to sleep early in the evening. Pt is pleasant upon approach and denies SI and SIB.     10/22/18 0995   Behavioral Health   Hallucinations denies / not responding to hallucinations   Thinking distractable   Orientation situation, disoriented;person: oriented;place: oriented   Memory baseline memory   Insight insight appropriate to situation   Judgement impaired   Eye Contact at examiner;out of corner of eyes   Affect blunted, flat   Mood mood is calm   Physical Appearance/Attire attire appropriate to age and situation   Hygiene well groomed   Suicidality (denies)   1. Wish to be Dead No   2. Non-Specific Active Suicidal Thoughts  No   Psycho Education   Type of Intervention 1:1 intervention   Response participates with encouragement   Hours 0.5   Treatment Detail (Check-in)   Activities of Daily Living   Hygiene/Grooming independent   Oral Hygiene independent   Dress street clothes;scrubs (behavioral health)   Laundry unable to complete   Room Organization independent

## 2018-10-23 NOTE — PLAN OF CARE
"Problem: Mood Impairment (Depressive Signs/Symptoms) (Adult)  Goal: Improved Mood Symptoms (Depressive Signs/Symptoms)  Outcome: Improving  \"My mood is better\". Patient states depression was at 10 with 10 high at time of admission and denies depression currently. Anxiety has improved. States the voices have improved, \"they go quiet\". Thoughts are more clear. Attends most the groups, is minimally social.       "

## 2018-10-23 NOTE — PROGRESS NOTES
10/23/18 1138   Wound 10/15/18 Left Wrist Self inflicted   Date First Assessed/Time First Assessed: 10/15/18 1049   Orientation: Left  Location: Wrist  Wound Type: Self inflicted   Site Assessment Clean, dry, intact   Drainage Amount None   Wound Care/Cleansing Wound cleanser   Dressing Foam;Other (Comment)  (bandage)   Dressing Status New dressing   Wound/Skin Edges (WOC) Attached edges     Wound care completed.  No signs/symptoms of infection.  Pt denies any pain.  Edges are well approximated and attached.  No other issues.  Will continue to monitor.

## 2018-10-23 NOTE — PROGRESS NOTES
Re - commitment / moore hearing    Hearing to be held tomorrow. This writer spoke with Graciela Hernandez with [facility]. Per Graciela [facility] would like to have a list of medications that provider would like to have on the Moore order. This is not something that is listed on petition forms. Per Graciela this writer can report them to her tomorrow a.m and she will follow up with attorneys in time for hearing.     Also asked that records be faxed to her at [phone]

## 2018-10-24 PROCEDURE — G0177 OPPS/PHP; TRAIN & EDUC SERV: HCPCS

## 2018-10-24 PROCEDURE — 25000132 ZZH RX MED GY IP 250 OP 250 PS 637: Performed by: STUDENT IN AN ORGANIZED HEALTH CARE EDUCATION/TRAINING PROGRAM

## 2018-10-24 PROCEDURE — 12400007 ZZH R&B MH INTERMEDIATE UMMC

## 2018-10-24 PROCEDURE — 99232 SBSQ HOSP IP/OBS MODERATE 35: CPT | Mod: GC | Performed by: PSYCHIATRY & NEUROLOGY

## 2018-10-24 RX ADMIN — TRAZODONE HYDROCHLORIDE 100 MG: 100 TABLET ORAL at 19:08

## 2018-10-24 RX ADMIN — OLANZAPINE 10 MG: 10 TABLET, FILM COATED ORAL at 20:49

## 2018-10-24 RX ADMIN — OLANZAPINE 10 MG: 10 TABLET, FILM COATED ORAL at 19:08

## 2018-10-24 RX ADMIN — RISPERIDONE 4 MG: 2 TABLET ORAL at 19:08

## 2018-10-24 ASSESSMENT — ACTIVITIES OF DAILY LIVING (ADL)
DRESS: INDEPENDENT
LAUNDRY: WITH SUPERVISION
GROOMING: INDEPENDENT
ORAL_HYGIENE: INDEPENDENT

## 2018-10-24 NOTE — PROGRESS NOTES
Isolative to self. Not social but pleasant when approached.  Appears responding to internal stim. Very distracted. Pacing staring into space or at floor. Blunted affect, preoccupied. Ate meals.       10/23/18 2000   Behavioral Health   Hallucinations appears responding   Thinking distractable;poor concentration   Orientation person: oriented;place: oriented   Memory baseline memory   Insight admits / accepts   Eye Contact at floor;into space   Affect blunted, flat;other (see comments)  (Preoccupied)   Mood mood is calm   Physical Appearance/Attire attire appropriate to age and situation   Hygiene well groomed   Activity withdrawn;isolative   Speech clear;other (see comments)   Psychomotor / Gait balanced;steady   Psycho Education   Type of Intervention structured groups   Response observes from a distance   Hours 0.5   Treatment Detail Community mtg

## 2018-10-24 NOTE — PROGRESS NOTES
Re - legal - commitment / moore proceedings      Final orders not yet received.     Per phone call from Graciela Grajeda with [facility] - the  did order commitment however Moore is not approved. Per Graciela patient's  advocated for no Moore as has been taking medications on unit. 's decision as noted above.      This writer did inform team psychiatry resident of this verbal report - will not change legal status until an order is received .     patient is assigned to  - Jenniffer    Per Graciela they also spoke with patient at length re IRTS and reassured her that this would be a temporary post hospital plan and patient appeared to be relieved by this and more agreeable.

## 2018-10-24 NOTE — PLAN OF CARE
Problem: Mood Impairment (Depressive Signs/Symptoms) (Adult)  Goal: Improved Mood Symptoms (Depressive Signs/Symptoms)  Outcome: Improving  Patient quietly social with peers. She attends groups. Affect is flat and mood is depressed. She attended commitment hearing this afternoon. Returned stating she was on full commitment. Poor eye contact. She was cooperative with clothing search upon return from court. She is med compliant. Denies SI and SIB. Nicki Hamlin RN

## 2018-10-24 NOTE — PROGRESS NOTES
RE - court     Spoke with Graciela Grajeda -  with [facility] re today's recommendations     Informed her that proposed medications for Green are as follows:  Risperidone  Haloperidol  Olanzapine  Clozapine  Paliperidone    Per Graciela patient's  is inquiring about dropping Green as patient has been taking medications. This writer reiterated that recommendation is for full commitment and Green - also that patient has demonstrated non compliance with prescribed medications post acute hospitalizations in the recent history which has been a contributing factor to her further decompensation.  Also dicussed lack of insight ability to understand/consent. Per Graciela Beach (court examiner) will be available for court via phone as for his recommendations to court.
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
## 2018-10-25 PROCEDURE — 25000128 H RX IP 250 OP 636: Performed by: STUDENT IN AN ORGANIZED HEALTH CARE EDUCATION/TRAINING PROGRAM

## 2018-10-25 PROCEDURE — H2032 ACTIVITY THERAPY, PER 15 MIN: HCPCS

## 2018-10-25 PROCEDURE — 99232 SBSQ HOSP IP/OBS MODERATE 35: CPT | Mod: GC | Performed by: PSYCHIATRY & NEUROLOGY

## 2018-10-25 PROCEDURE — 25000132 ZZH RX MED GY IP 250 OP 250 PS 637: Performed by: STUDENT IN AN ORGANIZED HEALTH CARE EDUCATION/TRAINING PROGRAM

## 2018-10-25 PROCEDURE — 12400007 ZZH R&B MH INTERMEDIATE UMMC

## 2018-10-25 PROCEDURE — G0177 OPPS/PHP; TRAIN & EDUC SERV: HCPCS

## 2018-10-25 PROCEDURE — G0463 HOSPITAL OUTPT CLINIC VISIT: HCPCS

## 2018-10-25 RX ORDER — OLANZAPINE 10 MG/2ML
10 INJECTION, POWDER, FOR SOLUTION INTRAMUSCULAR
Status: DISCONTINUED | OUTPATIENT
Start: 2018-10-25 | End: 2018-11-02 | Stop reason: HOSPADM

## 2018-10-25 RX ORDER — OLANZAPINE 10 MG/1
10 TABLET ORAL
Status: DISCONTINUED | OUTPATIENT
Start: 2018-10-25 | End: 2018-11-02 | Stop reason: HOSPADM

## 2018-10-25 RX ADMIN — RISPERIDONE 4 MG: 2 TABLET ORAL at 21:53

## 2018-10-25 RX ADMIN — RISPERIDONE 25 MG: KIT at 21:54

## 2018-10-25 ASSESSMENT — ACTIVITIES OF DAILY LIVING (ADL): GROOMING: PROMPTS

## 2018-10-25 NOTE — PROGRESS NOTES
RE - Legal - Commitment     Order for commitment (and order to dismiss moore) received on unit  Copy given to patient. Signed certificate of personal service sent to court by this writer.    Copy placed for H.I.M scan  Copy placed in paper chart    Team provider notified.

## 2018-10-25 NOTE — PLAN OF CARE
Problem: Occupational Therapy Goals (Adult)  Goal: Occupational Therapy Goals  Stand Alone Therapy Goal   Outcome: Therapy, progress toward functional goals as expected  OT: pt remained quiet throughout group with hoodie of sweatshirt over head, however, pt did pleasant affect and self-initiated in activity maintaining focused-attention for duration of OT group, pt advocated for self to get materials to finish project and completed tasks
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
## 2018-10-25 NOTE — PROGRESS NOTES
"   10/25/18 4910   Behavioral Health   Hallucinations denies / not responding to hallucinations   Thinking distractable   Orientation person: oriented;place: oriented   Insight poor   Judgement impaired   Eye Contact into space   Affect blunted, flat;sad   Mood mood is calm   Physical Appearance/Attire attire appropriate to age and situation   Suicidality other (see comments)  (denies)   1. Wish to be Dead No   2. Non-Specific Active Suicidal Thoughts  No   Self Injury other (see comment)  (None observed or reported)   Activity isolative;withdrawn   Speech clear   Medication Sensitivity no stated side effects   Psychomotor / Gait balanced     Pt has been in and out of her room. Affect looked down/sad. Appearance is appropriate to her situation. Out for meals, and attended some groups. Took naps because she stated she felt tiered, but was more alert in the afternoon. During 1:1, pt stated hearing no voices today. Described her mood as, \"doing alright.\" Denies feeling sedation from medication. Her goal is to attend groups. Contracts for safety.   "

## 2018-10-25 NOTE — PLAN OF CARE
Problem: Patient Care Overview  Goal: Team Discussion  Team Plan:    BEHAVIORAL TEAM DISCUSSION    Participants:   Radha Montero MD Attending Psychiatrist  Manuel Griggs MD PGY1  Dolly Greco MSW, [facility] Clinical Treatment Coordinator  MARTHA Murray, MS4   Progress: progress towards functional goals is gradual  Continued Stay Criteria/Rationale: symptosm of psychosis persist requiring ongoing mediation monitoring / adustment / assessment. Discharge disposition/ provisional discharge planning ongoing. patient is committed (MI)   Medical/Physical:   Per psychiatry physician progress note:   Medical diagnoses to be addressed this admission:    # Orthostatic Hypotension, Resolved  - Likely due to Risperidone and Trazodone nighttime medications  - Monitor with daily vitals     # Left forearm laceration  - Healing; no signs of infection  - Continue wound care with Bacitracin, bandages     Laboratory/Imaging:   Labs 10/10/18: low protein at 6.6, otherwise WNL  MRI 10/12/18: Normal  EKG 10/17/18: showed nonspecific ST and T wave abnormalities; no need for follow-up at this time  Precautions:   Behavioral Orders   Procedures     Code 2     Routine Programming     As clinically indicated     Self Injury Precaution     Status 15     Every 15 minutes.     Suicide precautions     Patients on Suicide Precautions should have a Combination Diet ordered that includes a Diet selection(s) AND a Behavioral Tray selection for Safe Tray - with utensils, or Safe Tray - NO utensils       Plan: continue with medication management per psychiatry - continue with assessment for discharge/provisional discharge disposition and planning.   Rationale for change in precautions or plan: per ongoing assessment.
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
## 2018-10-25 NOTE — PROGRESS NOTES
Re- IRTS referrals    This writer met with patient today to discuss referral to IRTS. patient asked if attending program is the recommendation informed her that yes is both the recommendation of the team and county .    patient signed re for referral to all IRTS programs    This writer sent initial referrals to   - [place]  - [facility] (3 programs)   - [facility] (5 programs)  - [facility] [place]  - [facility]  - [facility]

## 2018-10-26 PROCEDURE — 12400007 ZZH R&B MH INTERMEDIATE UMMC

## 2018-10-26 PROCEDURE — 99221 1ST HOSP IP/OBS SF/LOW 40: CPT | Performed by: PHYSICIAN ASSISTANT

## 2018-10-26 PROCEDURE — 99232 SBSQ HOSP IP/OBS MODERATE 35: CPT | Mod: GC | Performed by: PSYCHIATRY & NEUROLOGY

## 2018-10-26 PROCEDURE — 99207 ZZC CDG-CODE CATEGORY CHANGED: CPT | Performed by: PHYSICIAN ASSISTANT

## 2018-10-26 PROCEDURE — G0177 OPPS/PHP; TRAIN & EDUC SERV: HCPCS

## 2018-10-26 PROCEDURE — 25000132 ZZH RX MED GY IP 250 OP 250 PS 637: Performed by: STUDENT IN AN ORGANIZED HEALTH CARE EDUCATION/TRAINING PROGRAM

## 2018-10-26 RX ADMIN — RISPERIDONE 4 MG: 2 TABLET ORAL at 18:43

## 2018-10-26 ASSESSMENT — ACTIVITIES OF DAILY LIVING (ADL)
ORAL_HYGIENE: INDEPENDENT
DRESS: INDEPENDENT
LAUNDRY: WITH SUPERVISION
GROOMING: INDEPENDENT

## 2018-10-26 NOTE — PROGRESS NOTES
Pt was visible passing the halls, isolative and withdrawn. She attended groups and participated, minimally social with peers when they prompted interaction.      10/26/18 1500   Behavioral Health   Hallucinations appears responding   Thinking distractable   Orientation person: oriented;place: oriented;date: oriented;time: oriented   Memory baseline memory   Insight poor   Judgement impaired   Eye Contact at floor;into space   Affect blunted, flat;sad   Mood depressed   Physical Appearance/Attire attire appropriate to age and situation   Hygiene other (see comment)  (adequate )   1. Wish to be Dead No   2. Non-Specific Active Suicidal Thoughts  No   Activity isolative;withdrawn   Speech clear;coherent   Medication Sensitivity no observed side effects   Psychomotor / Gait balanced;steady   Activities of Daily Living   Hygiene/Grooming independent   Oral Hygiene independent   Dress independent   Laundry with supervision   Room Organization independent   Activity   Activity Assistance Provided independent

## 2018-10-26 NOTE — CONSULTS
"Brief Medicine Note    S: Patient is a 17yo female with a hx of psychosis, depression with suicidal ideation and marijuana use. She was admitted to station 22 for depression with suicidal ideation. Internal medicine was consulted for assessment of vaginal discharge, dysuria and pruritis.     Patient reports having brown vaginal discharge for approximately one year. She states the discharge will saturate approximately 2-3 panty liners. She denies any bloody discharge. Associated sx including occasional vaginal pruritis and dysuria, but denies urinary frequency or urgency, abdominal pain, diarrhea, hematochezia, melena, or vomiting. Her last BM was yesterday and she is passing gas normally. She states she sometimes feels constipated.     She states her LNMP was 2 months ago, and it normally lasts 3-4 days in duration. She states she normally has her menstrual period every month. She denies ever being sexually active in the past. She denies hx of pregnancy or hx of STI or UTIs. She has not been seen by on outside provider for her vaginal discharge in the past.     Today's vital signs, medications, and nursing notes were reviewed. Patient had a negative HCG urine test performed on admission on 10/13/18. CBC showed no signs of leukocytosis or anemia. CMP without any acute electrolyte abnormalities. Lipid panel reviewed and WNL.TSH 0.43. Utox positive for Cannabinoids.    Patient's past medical history, allergies, medications, and prior hospitalizations/surgeries were reviewed; no other pertinent medical history.     O:  /70 (BP Location: Right arm)  Pulse 79  Temp 97.6  F (36.4  C) (Tympanic)  Resp 16  Ht 1.6 m (5' 3\")  Wt 65.3 kg (144 lb)  LMP  (LMP Unknown)  SpO2 99%  BMI 25.51 kg/m2  General: A&O. NAD. Answering questions appropriately.  Cardiac: RRR, normal S1 and S2. No murmurs, gallops or friction rubs.   Respiratory: Lungs CTAB.   GI: abdomen is soft and non-tender. +BS. No guarding, rigidity or " rebound ttp.   : exam deferred    A/P:  Psychosis, depression with suicidal ideation, cannabinoid abuse:  -Management per psychiatry team.     Vaginal discharge: Patient with a hx of chronic brown vaginal discharge x1 year with associated vaginal pruritis and dysuria. She denies ever being sexually active. HCG qualitative urine from 10/13/18 negative. Abdominal exam is benign without any suprapubic tenderness, guarding rigidity or rebound tenderness. She denies any diarrhea or rectal involvement.  -Will have patient perform a self wet prep swab to assess for BV, yeast, and trichomonas given vaginal discharge.  -Will assess with urinalysis and reflex to culture to assess for UTI given dysuria.  -Will perform Chlamydia and Gonorrhea testing via urine to assess for infection.   -Will follow-up on results and initiate therapy if warranted.  -Educated patient on pelvic hygiene   -Instruct patient to follow-up with her PCP or Ob/Gyn following discharge for follow-up and for formal pelvic examination.     I will follow-up on results. Otherwise no further medical intervention is required at this time. Please feel free to call with any questions.     Isabelle Mc PA-C  Hospitalist Service  Pager [phone]

## 2018-10-26 NOTE — PLAN OF CARE
Problem: Occupational Therapy Goals (Adult)  Goal: Occupational Therapy Goals  Stand Alone Therapy Goal   Nelida  attended 2 of 3 OT groups today. No visible change in presentation while present during recent OT groups. Affect flat, restricted. Very quiet. Polite. Initiates task and is able to ask for help when needed.

## 2018-10-26 NOTE — PROGRESS NOTES
Pt is visible in milieu during meals and snacks. Pt affect is blunted, she appears depressed. Pt lacks eye contact, speech is soft and minimal. Pt refuses 1:1 with this writer.      10/25/18 2101   Behavioral Health   Hallucinations appears responding   Thinking distractable   Insight poor   Judgement impaired   Affect blunted, flat;sad   Mood depressed   Physical Appearance/Attire disheveled   Hygiene neglected grooming - unclean body, hair, teeth   Activity isolative   Speech coherent   Psycho Education   Type of Intervention 1:1 intervention   Response refuses   Activities of Daily Living   Hygiene/Grooming prompts
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
## 2018-10-27 LAB
ALBUMIN UR-MCNC: NEGATIVE MG/DL
APPEARANCE UR: CLEAR
BACTERIA #/AREA URNS HPF: ABNORMAL /HPF
BILIRUB UR QL STRIP: NEGATIVE
COLOR UR AUTO: YELLOW
GLUCOSE UR STRIP-MCNC: NEGATIVE MG/DL
HGB UR QL STRIP: ABNORMAL
KETONES UR STRIP-MCNC: NEGATIVE MG/DL
LEUKOCYTE ESTERASE UR QL STRIP: ABNORMAL
MUCOUS THREADS #/AREA URNS LPF: PRESENT /LPF
NITRATE UR QL: NEGATIVE
PH UR STRIP: 7 PH (ref 5–7)
RBC #/AREA URNS AUTO: >182 /HPF (ref 0–2)
SOURCE: ABNORMAL
SP GR UR STRIP: 1.02 (ref 1–1.03)
SPECIMEN SOURCE: NORMAL
SQUAMOUS #/AREA URNS AUTO: 1 /HPF (ref 0–1)
UROBILINOGEN UR STRIP-MCNC: NORMAL MG/DL (ref 0–2)
WBC #/AREA URNS AUTO: 10 /HPF (ref 0–5)
WET PREP SPEC: NORMAL

## 2018-10-27 PROCEDURE — 87591 N.GONORRHOEAE DNA AMP PROB: CPT | Performed by: PHYSICIAN ASSISTANT

## 2018-10-27 PROCEDURE — 87210 SMEAR WET MOUNT SALINE/INK: CPT | Performed by: PHYSICIAN ASSISTANT

## 2018-10-27 PROCEDURE — 12400007 ZZH R&B MH INTERMEDIATE UMMC

## 2018-10-27 PROCEDURE — 81001 URINALYSIS AUTO W/SCOPE: CPT | Performed by: PHYSICIAN ASSISTANT

## 2018-10-27 PROCEDURE — 87491 CHLMYD TRACH DNA AMP PROBE: CPT | Performed by: PHYSICIAN ASSISTANT

## 2018-10-27 PROCEDURE — 25000132 ZZH RX MED GY IP 250 OP 250 PS 637: Performed by: STUDENT IN AN ORGANIZED HEALTH CARE EDUCATION/TRAINING PROGRAM

## 2018-10-27 RX ADMIN — RISPERIDONE 4 MG: 2 TABLET ORAL at 19:00

## 2018-10-27 ASSESSMENT — ACTIVITIES OF DAILY LIVING (ADL)
LAUNDRY: WITH SUPERVISION
ORAL_HYGIENE: INDEPENDENT
GROOMING: INDEPENDENT
DRESS: INDEPENDENT

## 2018-10-28 LAB
C TRACH DNA SPEC QL NAA+PROBE: NEGATIVE
N GONORRHOEA DNA SPEC QL NAA+PROBE: NEGATIVE
SPECIMEN SOURCE: NORMAL
SPECIMEN SOURCE: NORMAL

## 2018-10-28 PROCEDURE — 25000132 ZZH RX MED GY IP 250 OP 250 PS 637: Performed by: STUDENT IN AN ORGANIZED HEALTH CARE EDUCATION/TRAINING PROGRAM

## 2018-10-28 PROCEDURE — 12400007 ZZH R&B MH INTERMEDIATE UMMC

## 2018-10-28 PROCEDURE — 87086 URINE CULTURE/COLONY COUNT: CPT | Performed by: PHYSICIAN ASSISTANT

## 2018-10-28 PROCEDURE — G0177 OPPS/PHP; TRAIN & EDUC SERV: HCPCS

## 2018-10-28 RX ADMIN — RISPERIDONE 4 MG: 2 TABLET ORAL at 17:22

## 2018-10-28 RX ADMIN — OLANZAPINE 10 MG: 10 TABLET, FILM COATED ORAL at 19:00

## 2018-10-28 ASSESSMENT — ACTIVITIES OF DAILY LIVING (ADL)
ORAL_HYGIENE: INDEPENDENT
ORAL_HYGIENE: INDEPENDENT
LAUNDRY: WITH SUPERVISION
GROOMING: INDEPENDENT
DRESS: INDEPENDENT
DRESS: INDEPENDENT
GROOMING: INDEPENDENT

## 2018-10-28 NOTE — PLAN OF CARE
"Problem: Mood Impairment (Depressive Signs/Symptoms) (Adult)  Goal: Improved Mood Symptoms (Depressive Signs/Symptoms)  Outcome: Improving  \"I'm pretty good\". When patient was asked what has improved patient's response was \"my mood, my attitude\". Affect is flat.  Denies depression and anxiety. C/o racing thoughts at time of admission but denies currently. Denies auditory hallucinations currently. Thoughts are more clear and is able to focus and concentrate better. Attends most the groups, is minimally social. Has spent most the shift in room lying in bed. Ate meals in lounge.      "

## 2018-10-28 NOTE — PLAN OF CARE
Problem: Mood Impairment (Depressive Signs/Symptoms) (Adult)  Goal: Improved Mood Symptoms (Depressive Signs/Symptoms)  Outcome: Improving  Pt states she is feeling better, denies si , states she sometimes hears voices but they do not bother her. Received food from a friend which made her happy . She is isolative to her  Room and in bed alot

## 2018-10-28 NOTE — PLAN OF CARE
Problem: Mood Impairment (Depressive Signs/Symptoms) (Adult)  Goal: Improved Mood Symptoms (Depressive Signs/Symptoms)  Outcome: No Change  Patient withdrawn out in the milieu. Watching tv with minimal interaction. Her affect is flat and mood depressed. Appears preoccupied. She retired to bed early and slept last half of the shift. She is med compliant. Denies SI and SIB Nicki Hamlin RN

## 2018-10-29 LAB
BACTERIA SPEC CULT: NORMAL
Lab: NORMAL
SPECIMEN SOURCE: NORMAL

## 2018-10-29 PROCEDURE — 12400007 ZZH R&B MH INTERMEDIATE UMMC

## 2018-10-29 PROCEDURE — 25000132 ZZH RX MED GY IP 250 OP 250 PS 637: Performed by: STUDENT IN AN ORGANIZED HEALTH CARE EDUCATION/TRAINING PROGRAM

## 2018-10-29 PROCEDURE — 99231 SBSQ HOSP IP/OBS SF/LOW 25: CPT | Mod: GC | Performed by: PSYCHIATRY & NEUROLOGY

## 2018-10-29 PROCEDURE — G0177 OPPS/PHP; TRAIN & EDUC SERV: HCPCS

## 2018-10-29 RX ADMIN — RISPERIDONE 4 MG: 2 TABLET ORAL at 18:36

## 2018-10-29 ASSESSMENT — ACTIVITIES OF DAILY LIVING (ADL)
DRESS: INDEPENDENT
ORAL_HYGIENE: INDEPENDENT
GROOMING: INDEPENDENT
LAUNDRY: WITH SUPERVISION
DRESS: INDEPENDENT
LAUNDRY: WITH SUPERVISION
ORAL_HYGIENE: INDEPENDENT
GROOMING: INDEPENDENT

## 2018-10-29 NOTE — PLAN OF CARE
"Problem: Occupational Therapy Goals (Adult)  Goal: Occupational Therapy Goals  Stand Alone Therapy Goal     Pt attended 1 out of 3 OT groups offered. Pt actively participated in occupational therapy clinic. Pt was able to ask for assistance as needed, and independently initiated a familiar creative expression task. Pt demonstrated good focus, planning, problem solving, and attention to detail. Pt mostly kept to herself during this group, and appeared engaged in her task. She appeared to enjoy making music selections throughout group. Pt actively participated in about half (no charge) of a structured occupational therapy group involving a self-reflecting, group leisure task. Pt appeared comfortable sharing positive past experiences and personal information with peers, and was respectful in listening and responding to peers. When prompted to share about something she considers to be a miracle, she shared that in the past, she cut herself \"pretty deep, but it only look like 2 weeks to heal.\" She smiled when telling stories about her family. Pleasant and cooperative throughout groups today. Flat affect, but appears to brighten on approach.        "

## 2018-10-29 NOTE — PLAN OF CARE
Problem: Mood Impairment (Depressive Signs/Symptoms) (Adult)  Goal: Improved Mood Symptoms (Depressive Signs/Symptoms)  Outcome: No Change  Patient is out in the milieu at times. During those times she has minimal interaction. Her affect is flat and blunted. Mood is calm. Appears distracted when observed in the milieu. She is med compliant. Denies SI and SIB. ;Nicki Hamlin RN

## 2018-10-29 NOTE — PROGRESS NOTES
"Patient had an unremarkable shift. She has been present in the milieu and attending groups.  Patient denies all mental health symptoms. States \"I'm just ready to get out of here\"     SI/SIB/HI: denies      Auditory or visual hallucinations:   Anxiety: denies   Depression: denies  Appetite: good   Hygiene: well groomed  SE: denies   Denies concerns regarding sleep, appetite, medication side effects. Will continue to monitor.        10/29/18 1441   Behavioral Health   Hallucinations denies / not responding to hallucinations   Thinking other (see comment)  (fair)   Orientation person: oriented;place: oriented;date: oriented   Memory baseline memory   Insight poor   Judgement impaired   Eye Contact at examiner   Affect/Mood (WDL) Ex.   Affect blunted, flat   Mood mood is calm   ADL Assessment (WDL) WDL   Physical Appearance/Attire attire appropriate to age and situation   Hygiene well groomed   Suicidality (WDL) WDL   Suicidality other (see comments)  (denies)   1. Wish to be Dead No   2. Non-Specific Active Suicidal Thoughts  No   Self Injury other (see comment)  (denies)   Elopement (WDL) WDL   Activity (WDL) WDL   Activity (attending groups)   Speech (WDL) WDL   Speech coherent;clear   Medication Sensitivity (WDL) WDL   Medication Sensitivity no observed side effects;no stated side effects   Psychomotor Gait (WDL) WDL   Psychomotor / Gait balanced   Overt Agression (WDL) WDL   Substance Withdrawal   Substance Withdrawal None   Safety   Suicidality Status 15   Coping/Psychosocial   Verbalized Emotional State hopefulness   Plan Of Care Reviewed With patient   Patient Agreement with Plan of Care agrees   Activities of Daily Living   Hygiene/Grooming independent   Oral Hygiene independent   Dress independent   Laundry with supervision   Room Organization independent   Activity   Activity Assistance Provided independent   Behavioral Health Interventions   Depression maintain safety precautions;monitor need to revise level " of observation;maintain safe secure environment;assist patient in following safety plan;assist patient in developing safety plan;encourage nutrition and hydration;encourage participation / independence with adls;provide emotional support;establish therapeutic relationship;assist with developing and utilizing healthy coping strategies;build upon strengths;assess patient response to medication;assess medication adherance;monitor need for prn medication;monitor confusion, memory loss, decision making ability and reorient / intervene as needed   Social and Therapeutic Interventions (Depression) encourage socialization with peers;encourage effective boundaries with peers;encourage participation in therapeutic groups and milieu activities

## 2018-10-30 PROCEDURE — G0177 OPPS/PHP; TRAIN & EDUC SERV: HCPCS

## 2018-10-30 PROCEDURE — 12400007 ZZH R&B MH INTERMEDIATE UMMC

## 2018-10-30 PROCEDURE — 99232 SBSQ HOSP IP/OBS MODERATE 35: CPT | Mod: GC | Performed by: PSYCHIATRY & NEUROLOGY

## 2018-10-30 PROCEDURE — 25000132 ZZH RX MED GY IP 250 OP 250 PS 637: Performed by: STUDENT IN AN ORGANIZED HEALTH CARE EDUCATION/TRAINING PROGRAM

## 2018-10-30 RX ORDER — LANOLIN ALCOHOL/MO/W.PET/CERES
3 CREAM (GRAM) TOPICAL EVERY EVENING
Status: DISCONTINUED | OUTPATIENT
Start: 2018-10-30 | End: 2018-11-02 | Stop reason: HOSPADM

## 2018-10-30 RX ADMIN — RISPERIDONE 4 MG: 2 TABLET ORAL at 19:02

## 2018-10-30 RX ADMIN — MELATONIN TAB 3 MG 3 MG: 3 TAB at 19:02

## 2018-10-30 RX ADMIN — OLANZAPINE 10 MG: 10 TABLET, FILM COATED ORAL at 19:02

## 2018-10-30 ASSESSMENT — ACTIVITIES OF DAILY LIVING (ADL)
GROOMING: INDEPENDENT
DRESS: INDEPENDENT
ORAL_HYGIENE: INDEPENDENT
GROOMING: PROMPTS
LAUNDRY: WITH SUPERVISION

## 2018-10-30 NOTE — PROGRESS NOTES
Pt visible in milieu, participating in groups, keeps to self.  Blunted, flat affect.  Pleasant upon approach.  Mood is calm.  Pt did laundry.  No hallucinations, SI/SIB or medication side effects observed.         10/30/18 1400   Behavioral Health   Hallucinations denies / not responding to hallucinations   Thinking intact   Orientation person: oriented;place: oriented;date: oriented;time: oriented   Memory baseline memory   Insight poor   Judgement intact   Eye Contact at examiner   Affect blunted, flat   Mood mood is calm   Physical Appearance/Attire attire appropriate to age and situation   Hygiene well groomed   Suicidality other (see comments)  (none observed)   1. Wish to be Dead No   2. Non-Specific Active Suicidal Thoughts  No   Self Injury other (see comment)  (none observed)   Activity other (see comment)  (visible in milieu)   Speech clear;coherent   Medication Sensitivity no observed side effects   Psychomotor / Gait balanced;steady   Activities of Daily Living   Hygiene/Grooming independent   Oral Hygiene independent   Dress independent   Laundry with supervision   Room Organization independent   Behavioral Health Interventions   Depression maintain safety precautions   Social and Therapeutic Interventions (Depression) encourage socialization with peers

## 2018-10-30 NOTE — PROGRESS NOTES
Pt had an okay shift. Pt kept to herself for majority of the shift. Pt was visible in the milieu during the early part of the shift. Pt spent majority of shift pacing the halls. Pt did not receive any visitors during shift. Pt did not need to be redirect at any point during the shift. Pt did not interact with peers during shift.     10/29/18 2139   Behavioral Health   Hallucinations denies / not responding to hallucinations   Thinking intact   Orientation person: oriented;place: oriented;date: oriented   Memory baseline memory   Insight poor   Judgement intact   Eye Contact at examiner   Affect blunted, flat   Mood mood is calm   Hygiene well groomed   Suicidality other (see comments)  (Non stated)   1. Wish to be Dead No   2. Non-Specific Active Suicidal Thoughts  No   Self Injury other (see comment)  (Non stated)   Activity isolative;withdrawn   Speech clear;coherent   Psychomotor / Gait balanced;steady   Activities of Daily Living   Hygiene/Grooming independent   Oral Hygiene independent   Dress independent   Laundry with supervision   Room Organization independent
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
## 2018-10-30 NOTE — PLAN OF CARE
Problem: Occupational Therapy Goals (Adult)  Goal: Occupational Therapy Goals  Stand Alone Therapy Goal     Nelida attended 3 of 3 OT groups today. During a discussion group on self-esteem and personal resources, she effectively selected a number of strengths that she identifies with, and willingly volunteered to discuss these with the group. Also  participated in OT clinic and was able to initiate task, follow through with plan and ask for help as needed.     During this session, she was given a simple task (age appropriate dot-to-dot handout) to informally assess her attention and concentration levels. She completed it quickly and without difficulty, demonstrating good on-task focus. Pt asked the purpose of the exercise, and appeared satisfied with the explanation that this writer gave (that the purpose was to get a better sense of her concentration level at this point).

## 2018-10-31 PROCEDURE — G0177 OPPS/PHP; TRAIN & EDUC SERV: HCPCS

## 2018-10-31 PROCEDURE — 25000132 ZZH RX MED GY IP 250 OP 250 PS 637: Performed by: STUDENT IN AN ORGANIZED HEALTH CARE EDUCATION/TRAINING PROGRAM

## 2018-10-31 PROCEDURE — 12400007 ZZH R&B MH INTERMEDIATE UMMC

## 2018-10-31 PROCEDURE — 99231 SBSQ HOSP IP/OBS SF/LOW 25: CPT | Mod: GC | Performed by: PSYCHIATRY & NEUROLOGY

## 2018-10-31 RX ADMIN — RISPERIDONE 4 MG: 2 TABLET ORAL at 19:59

## 2018-10-31 RX ADMIN — ACETAMINOPHEN 650 MG: 325 TABLET, FILM COATED ORAL at 17:19

## 2018-10-31 RX ADMIN — MELATONIN TAB 3 MG 3 MG: 3 TAB at 19:58

## 2018-10-31 ASSESSMENT — ACTIVITIES OF DAILY LIVING (ADL)
GROOMING: INDEPENDENT
DRESS: INDEPENDENT
GROOMING: HANDWASHING;INDEPENDENT
ORAL_HYGIENE: INDEPENDENT
LAUNDRY: WITH SUPERVISION
ORAL_HYGIENE: INDEPENDENT
DRESS: SCRUBS (BEHAVIORAL HEALTH);INDEPENDENT
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
## 2018-10-31 NOTE — PLAN OF CARE
Problem: Mood Impairment (Depressive Signs/Symptoms) (Adult)  Goal: Improved Mood Symptoms (Depressive Signs/Symptoms)  Outcome: Improving  Patient stays on periphery of milieu. She did attend group. Affect is flat and mood slightly anxious. She is hopeful to be discharged soon. Has an interview on unit tomorrow. She has been med compliant. Denies SI and SIB. Nicki Hamlin RN

## 2018-10-31 NOTE — PLAN OF CARE
"Problem: Occupational Therapy Goals (Adult)  Goal: Occupational Therapy Goals  Stand Alone Therapy Goal     Pt attended 2 out of 3 OT groups offered. Pt actively participated in a structured occupational therapy group with a focus on a multi-step creative expression task. Pt developed a plan for her task and demonstrated independent decision making in selecting various materials. Pt demonstrated good focus, problem solving, and attention to detail. Politely assertive with a peer regarding making a music selection, which is an improvement, as pt tends to quietly keep to herself during groups. She also politely assisted a peer with their task. Pt actively participated in a structured occupational therapy group with a focus on facilitating self-esteem via self-reflection questions. Pt shared thoughtful responses regarding past achievements, positive social supports, and hobbies/skills. She demonstrated poor self-esteem in responding to prompts; for example, when prompted about what she would like to change about herself, she stated \"I would like to change a lot,\" and subsequently identified \"I wish I didn't smile all the time.\" When peers complimented her smile, she laughed, but appeared to have difficulty accepting the compliment. Pleasant, cooperative, and engaged throughout groups today. Appeared to brighten with social interaction.        "

## 2018-10-31 NOTE — PROGRESS NOTES
RE - IRTS (Provisional Discharge planning)     Thursday November 1, 2018 - 9:00am/9:30am - intake interview on unit - IRTS staff to come to unit.     This writer received call from treatment director at [facility] [place] [facility] today asking if patient would be appropriate for intake interview for immediate bed. This first message was received around noon. In message director said she was also having their staff verify that patient's insurance does cover IRTS. Per discussion with team patient is appropriate clinically for transition to IRTS level of care.    This writer later spoke with Rosi with above program. She reports they did verify with patient's insurance that IRTS are covered. Would like to do intake interview tomorrow morning. Said they do have ability to have a staff come to unit for interview estimated sometime around 9:00am / 9:30am. Said that if needing to change time will call this writer.     This writer updated patient who is happy to interview tomorrow.     This writer also left voicemail with patient's [facility] [phone] informing her of above and that sounds likely that may have a PD date soon for patient asked for call back.

## 2018-10-31 NOTE — PROGRESS NOTES
Pt affect is flat. Pt appears depressed. She is isolative to room most evening. Pt presents as disheveled and unkempt. Pt presents as tearful and anxious on approach. She refuses 1:1 interview at this time.      10/30/18 2123   Behavioral Health   Hallucinations appears responding;auditory   Thinking distractable   Insight insight appropriate to situation   Judgement impaired   Eye Contact out of corner of eyes   Affect sad;tense   Mood depressed;anxious   Physical Appearance/Attire disheveled   Hygiene neglected grooming - unclean body, hair, teeth   Activity isolative   Speech coherent   Psycho Education   Type of Intervention 1:1 intervention   Response refuses   Group Therapy Session   Group Attendance refused to attend group session   Activities of Daily Living   Hygiene/Grooming prompts

## 2018-11-01 PROCEDURE — G0177 OPPS/PHP; TRAIN & EDUC SERV: HCPCS

## 2018-11-01 PROCEDURE — 99231 SBSQ HOSP IP/OBS SF/LOW 25: CPT | Mod: GC | Performed by: PSYCHIATRY & NEUROLOGY

## 2018-11-01 PROCEDURE — 25000132 ZZH RX MED GY IP 250 OP 250 PS 637: Performed by: STUDENT IN AN ORGANIZED HEALTH CARE EDUCATION/TRAINING PROGRAM

## 2018-11-01 PROCEDURE — 12400007 ZZH R&B MH INTERMEDIATE UMMC

## 2018-11-01 RX ADMIN — MELATONIN TAB 3 MG 3 MG: 3 TAB at 18:59

## 2018-11-01 RX ADMIN — RISPERIDONE 4 MG: 2 TABLET ORAL at 18:58

## 2018-11-01 ASSESSMENT — ACTIVITIES OF DAILY LIVING (ADL)
GROOMING: INDEPENDENT
LAUNDRY: UNABLE TO COMPLETE
LAUNDRY: WITH SUPERVISION
DRESS: INDEPENDENT
DRESS: INDEPENDENT
ORAL_HYGIENE: INDEPENDENT
GROOMING: INDEPENDENT
ORAL_HYGIENE: INDEPENDENT

## 2018-11-01 NOTE — PROGRESS NOTES
Provisional Discharge agreement signed by patient and faxed to [facility] for signature.

## 2018-11-01 NOTE — PROGRESS NOTES
RE - WHITE prescription to be given at [facility]      [facility] pharmacy - [facility] [address] [facility] [address] ph: [phone]       discussed with program having patient's WHITE medication sent to the above pharmacy for delivery to the [facility] when due for RN staff to administer.

## 2018-11-01 NOTE — DISCHARGE INSTRUCTIONS
Behavioral Discharge Planning and Instructions      Summary:  You were admitted on 10/9/2018  due to Psychotic Symptomology and Suicidal Ideations.  You were treated by Dr. Nghia Duncan MD and Dr. Radha Montero MD and discharged on 11/2/18 from [facility] to [facility]    During your hospitalization a petition for commitment(MI) and Green was filed with Terrence Coffman and supported.  On 10/25/18 the court issued an order for Commitment and the petition for Green was dismissed.  You will discharge today from the hospital with a Provisional Discharge Agreement (see your copy) The terms of your Provisional Discharge will remain in effect for the duration of your commitment unless the Provisional Discharge plan is later modified.  The initial period of your commitment is effective is 6 months from the date of the order - unless this date is changed by the courts prior thereto.     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 Continue working with your  for ongoing resources and to allow for monitoring of the terms of your commitment,      Monday November 19, 2018 - 8:20am arrive - 8:40am appt.  Psychiatrist - Dr. Shen Yee     [facility] -[address] [place] [address] Phone: [phone] Fax: [phone]  It is very important you attend this appointment as you missed without canceling a prior intake appointment. If you miss without  "canceling/ rescheduling minimally 24 hour in advance you will not be allowed to schedule with this clinic in the future.   Please bring your insurance card, ID and medication list from the [facility] program.     Attend all scheduled appointments with your outpatient providers. Call at least 24 hours in advance if you need to reschedule an appointment to ensure continued access to your outpatient providers.   Major Treatments, Procedures and Findings:  You were provided with: a psychiatric assessment, assessed for medical stability, medication evaluation and/or management, group therapy and milieu management    Symptoms to Report: feeling more aggressive, increased confusion, losing more sleep, mood getting worse or thoughts of suicide    Early warning signs can include: increased depression or anxiety sleep disturbances increased thoughts or behaviors of suicide or self-harm  increased unusual thinking, such as paranoia or hearing voices    Safety and Wellness:  Take all medicines as directed.  Make no changes unless your doctor suggests them.      Follow treatment recommendations.  Refrain from alcohol and non-prescribed drugs.     Resources:   Crisis Intervention: 875.748.6672 or 129-253-5614 (TTY: 483.739.3770).  Call anytime for help.  National [place] [employer] Mental Illness (www.mn.manohar.org): 316.207.7217 or 606-209-2172.  Suicide Awareness Voices of Education (SAVE) (www.save.org): 882-012-SAVE (9983)  Text 4 Life: txt \"LIFE\" to 507[address] for immediate support and crisis intervention  Crisis text line: Text \"[place]\" to 178078. Free, confidential, 24/7.  Crisis Intervention: 203.566.1775 or 127-395-6231. Call anytime for help.   [facility] Crisis Team:  [phone]    The treatment team has appreciated the opportunity to work with you.     If you have any questions or concerns our unit number is [phone]      "

## 2018-11-01 NOTE — PROGRESS NOTES
Pt continues to keep to self but still participates in groups.  Blunted, flat affect.  Calm, cooperative and compliant.  No hallucinations, SI/SIB or medication sensitivity observed.       11/01/18 1300   Behavioral Health   Hallucinations denies / not responding to hallucinations   Thinking intact   Orientation person: oriented;place: oriented;date: oriented;time: oriented   Memory baseline memory   Insight poor   Judgement impaired   Eye Contact at examiner   Affect blunted, flat   Mood mood is calm   Physical Appearance/Attire attire appropriate to age and situation   Hygiene well groomed   Suicidality other (see comments)  (none observed)   1. Wish to be Dead No   2. Non-Specific Active Suicidal Thoughts  No   Self Injury other (see comment)  (none observed)   Activity isolative;withdrawn   Speech clear;coherent   Medication Sensitivity no observed side effects   Psychomotor / Gait balanced;steady   Psycho Education   Type of Intervention 1:1 intervention   Response observes from a distance   Activities of Daily Living   Hygiene/Grooming independent   Oral Hygiene independent   Dress independent   Laundry with supervision   Room Organization independent   Behavioral Health Interventions   Depression maintain safety precautions   Social and Therapeutic Interventions (Depression) encourage socialization with peers

## 2018-11-01 NOTE — PROGRESS NOTES
RE - [facility] / Provisional Discharge    patient is accepted to [facility] - would like to admit patient tomorrow - aiming for patient to leave hospital at 10:00am to [facility]    Spoke with patient's [facility]  - [facility] MAICOL Abad ph [phone] updated her about plan. She supports plan is happy to hear patient can go to program tomorrow. Reports she will go to [facility] next week Wednesday 11/7/18 1:30pm to meet with patient    For signing PD this writer will fax to her today at fax : [phone] . Per [place] she is currently on her way to [place] today but does expect to be back to her office today to sign if not will send to unit first thing tomorrow.
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
## 2018-11-01 NOTE — PLAN OF CARE
"Problem: Patient Care Overview  Goal: Plan of Care/Patient Progress Review    Pt is calm and pleasant with somewhat blunted affect. She states her mood is \"content.\" She denies si, depression and anxiety. Pt also denies s/sx psychosis; none noted. She is in the milieu, attended community mtg, generally keeps to self. Her goal for the billy is \"to think about what to say in my interview for an [facility], tomorrow.\"      "

## 2018-11-02 VITALS
DIASTOLIC BLOOD PRESSURE: 78 MMHG | HEART RATE: 101 BPM | TEMPERATURE: 96.6 F | RESPIRATION RATE: 16 BRPM | HEIGHT: 63 IN | WEIGHT: 148.5 LBS | OXYGEN SATURATION: 100 % | SYSTOLIC BLOOD PRESSURE: 122 MMHG | BODY MASS INDEX: 26.31 KG/M2

## 2018-11-02 PROCEDURE — 99238 HOSP IP/OBS DSCHRG MGMT 30/<: CPT | Mod: GC | Performed by: PSYCHIATRY & NEUROLOGY

## 2018-11-02 RX ORDER — RISPERIDONE 4 MG/1
4 TABLET ORAL AT BEDTIME
Qty: 30 TABLET | Refills: 0 | Status: SHIPPED | OUTPATIENT
Start: 2018-11-02

## 2018-11-02 RX ORDER — LANOLIN ALCOHOL/MO/W.PET/CERES
3 CREAM (GRAM) TOPICAL EVERY EVENING
Qty: 30 TABLET | Refills: 0 | Status: SHIPPED | OUTPATIENT
Start: 2018-11-02

## 2018-11-02 ASSESSMENT — ACTIVITIES OF DAILY LIVING (ADL)
LAUNDRY: UNABLE TO COMPLETE
GROOMING: INDEPENDENT
ORAL_HYGIENE: INDEPENDENT
DRESS: STREET CLOTHES

## 2018-11-02 NOTE — PROGRESS NOTES
Pt was observed in the periphery of the milieu playing video games, eating meals, and watching TV. Pt was pleasant upon approach and presented with a blunted affect throughout the evening. No SI or SIB observed throughout the evening.      11/01/18 2035   Behavioral Health   Hallucinations denies / not responding to hallucinations   Thinking intact   Orientation person: oriented;place: oriented;date: oriented   Memory baseline memory   Insight poor   Judgement impaired   Eye Contact at examiner   Affect blunted, flat   Mood mood is calm   Physical Appearance/Attire attire appropriate to age and situation   Hygiene well groomed   Suicidality (none observed)   1. Wish to be Dead No   2. Non-Specific Active Suicidal Thoughts  No   Self Injury (none observed)   Activity withdrawn   Speech clear;coherent   Medication Sensitivity no stated side effects;no observed side effects   Psychomotor / Gait balanced;steady   Psycho Education   Type of Intervention 1:1 intervention   Response observes from a distance   Activities of Daily Living   Hygiene/Grooming independent   Oral Hygiene independent   Dress independent   Laundry unable to complete   Room Organization independent
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
## 2018-11-02 NOTE — DISCHARGE SUMMARY
Psychiatric Discharge Summary    Hospital Day #24    Nelida Montano MRN# [id number]   Age: 18 year old YOB: 2000     Date of Admission:  10/9/2018  Date of Discharge:  {DISCHARGE DATE:738817}  Admitting Physician:  Nghia Duncan MD  Discharge Physician:  Nghia Duncan MD         Event Leading to Hospitalization:     ***(Insert presentation)***       See Admission note by Radha Montero* on *** for additional details.          Diagnoses:     Principal psychiatric diagnosis:   # Psychosis, Unspecified      Secondary psychiatric diagnoses:  # Suicide Attempt  # Cannabis Use Disorder       Consults:     Isabelle Mc PA-C, hospitalist service.          Hospital Course:   Psychiatric Course:  Nelida Montano was [facility] with attending Radha Montero* on a 72 hour mental health hold. PTA medication were ***    Nelida Montano was discharged to [facility]. At the time of discharge Nelida Montano was determined to not be a danger to herself or others.    Medical Course:        Risk Assessment:  Nelida Montano has notable risk factors for self-harm, including {SUICIDE RISK FACTORS:299733}. However, risk is mitigated by {SUICIDE PROTECTIVE FACTORS:304069}.Additional steps taken to minimize risk include: ***. Therefore, based on all available evidence including the factors cited above, Nelida Montano does not appear to be at imminent risk for self-harm, and is appropriate for outpatient level of care.     This document serves as a transfer of care to Nelida Montano's outpatient providers.         Discharge Medications:     Current Discharge Medication List      START taking these medications    Details   melatonin 3 MG tablet Take 1 tablet (3 mg) by mouth every evening  Qty: 30 tablet, Refills: 0    Associated Diagnoses: Episode of recurrent major depressive disorder, unspecified depression  episode severity (H)      risperiDONE (RISPERDAL) 4 MG tablet Take 1 tablet (4 mg) by mouth At Bedtime  Qty: 30 tablet, Refills: 0    Associated Diagnoses: Schizoaffective disorder, depressive type (H)      risperiDONE microspheres (RISPERDAL CONSTA) 50 MG injection Inject 2 mLs (50 mg) into the muscle every 14 days  Qty: 2 each, Refills: 0    Associated Diagnoses: Schizoaffective disorder, depressive type (H)         STOP taking these medications       Escitalopram Oxalate (LEXAPRO PO) Comments:   Reason for Stopping:         OLANZAPINE PO Comments:   Reason for Stopping:         TRAZODONE HCL PO Comments:   Reason for Stopping:                      Psychiatric Examination:   Appearance:  { :764833}  Attitude:  { :253332}  Eye Contact:  { :765030}  Mood:  { :953347}  Affect:  { :127870}  Speech:  { :477252}  Psychomotor Behavior:  { :531244}  Thought Process:  { :506975}  Associations:  { :450925}  Thought Content:  { :573573}  Insight:  { :663065}  Judgment:  { :113235}  Oriented to:  { :308849}  Attention Span and Concentration:  { :714785}  Recent and Remote Memory:  { :840113}  Language: { :376334}  Fund of Knowledge: { :487691}  Muscle Strength and Tone: { :398342}  Gait and Station: { :238177}         Discharge Plan:   Psychiatric Appointments: ***    Psychotherapy Appointments: ***      Other Referrals: ***    Pt seen and discussed with my attending, MD Brandon Sue MD  PGY-2 Resident  Pager: [phone]      Attestation:              Appendix A: All Labs This Admission:     Results for orders placed or performed during the hospital encounter of 10/09/18   MR Brain w/o Contrast    Narrative    MR BRAIN W/O CONTRAST 10/14/2018 9:01 AM    Provided History: First episode psychosis; rule out organic cause.    Comparison:  None available.     Technique: Sagittal T1-weighted and axial T2-weighted, turboFLAIR and  diffusion-weighted with ADC map images of the brain were obtained  without intravenous  contrast.    Findings: No intracranial mass lesion, mass effect, midline shift, or  abnormal extraaxial fluid collection. The ventricles and sulci are  normal for age. No restricted diffusion.  Normal intravascular flow  voids.      Impression    Impression: Normal brain MRI.         I have personally reviewed the examination and initial interpretation  and I agree with the findings.    YANELI FRY MD   Drug abuse screen 6 urine (tox)   Result Value Ref Range    Amphetamine Qual Urine Negative NEG^Negative    Barbiturates Qual Urine Negative NEG^Negative    Benzodiazepine Qual Urine Negative NEG^Negative    Cannabinoids Qual Urine Positive (A) NEG^Negative    Cocaine Qual Urine Negative NEG^Negative    Ethanol Qual Urine Negative NEG^Negative    Opiates Qualitative Urine Negative NEG^Negative   HCG qualitative urine   Result Value Ref Range    HCG Qual Urine Negative NEG^Negative   CBC with platelets differential   Result Value Ref Range    WBC 6.2 4.0 - 11.0 10e9/L    RBC Count 3.96 3.8 - 5.2 10e12/L    Hemoglobin 12.2 11.7 - 15.7 g/dL    Hematocrit 38.1 35.0 - 47.0 %    MCV 96 78 - 100 fl    MCH 30.8 26.5 - 33.0 pg    MCHC 32.0 31.5 - 36.5 g/dL    RDW 13.2 10.0 - 15.0 %    Platelet Count 230 150 - 450 10e9/L    Diff Method Automated Method     % Neutrophils 35.4 %    % Lymphocytes 54.7 %    % Monocytes 8.6 %    % Eosinophils 1.1 %    % Basophils 0.2 %    % Immature Granulocytes 0.0 %    Nucleated RBCs 0 0 /100    Absolute Neutrophil 2.2 1.6 - 8.3 10e9/L    Absolute Lymphocytes 3.4 0.8 - 5.3 10e9/L    Absolute Monocytes 0.5 0.0 - 1.3 10e9/L    Absolute Eosinophils 0.1 0.0 - 0.7 10e9/L    Absolute Basophils 0.0 0.0 - 0.2 10e9/L    Abs Immature Granulocytes 0.0 0 - 0.4 10e9/L    Absolute Nucleated RBC 0.0    Comprehensive metabolic panel   Result Value Ref Range    Sodium 144 133 - 144 mmol/L    Potassium 3.9 3.4 - 5.3 mmol/L    Chloride 109 94 - 109 mmol/L    Carbon Dioxide 28 20 - 32 mmol/L    Anion Gap 7 3 -  14 mmol/L    Glucose 74 70 - 99 mg/dL    Urea Nitrogen 10 7 - 30 mg/dL    Creatinine 0.91 0.52 - 1.04 mg/dL    GFR Estimate 78 >60 mL/min/1.7m2    GFR Estimate If Black >90 >60 mL/min/1.7m2    Calcium 8.6 8.5 - 10.1 mg/dL    Bilirubin Total 0.9 0.2 - 1.3 mg/dL    Albumin 3.5 3.4 - 5.0 g/dL    Protein Total 6.6 (L) 6.8 - 8.8 g/dL    Alkaline Phosphatase 46 40 - 150 U/L    ALT 18 0 - 50 U/L    AST 12 0 - 45 U/L   Lipid panel   Result Value Ref Range    Cholesterol 135 <200 mg/dL    Triglycerides 50 <150 mg/dL    HDL Cholesterol 62 >49 mg/dL    LDL Cholesterol Calculated 63 <100 mg/dL    Non HDL Cholesterol 73 <130 mg/dL   TSH with free T4 reflex and/or T3 as indicated   Result Value Ref Range    TSH 0.43 0.40 - 4.00 mU/L   Vitamin B12   Result Value Ref Range    Vitamin B12 915 193 - 986 pg/mL   Folate   Result Value Ref Range    Folate 11.8 >5.4 ng/mL   UA with Microscopic reflex to Culture   Result Value Ref Range    Color Urine Yellow     Appearance Urine Clear     Glucose Urine Negative NEG^Negative mg/dL    Bilirubin Urine Negative NEG^Negative    Ketones Urine Negative NEG^Negative mg/dL    Specific Gravity Urine 1.016 1.003 - 1.035    Blood Urine Large (A) NEG^Negative    pH Urine 7.0 5.0 - 7.0 pH    Protein Albumin Urine Negative NEG^Negative mg/dL    Urobilinogen mg/dL Normal 0.0 - 2.0 mg/dL    Nitrite Urine Negative NEG^Negative    Leukocyte Esterase Urine Small (A) NEG^Negative    Source Midstream Urine     WBC Urine 10 (H) 0 - 5 /HPF    RBC Urine >182 (H) 0 - 2 /HPF    Bacteria Urine Few (A) NEG^Negative /HPF    Squamous Epithelial /HPF Urine 1 0 - 1 /HPF    Mucous Urine Present (A) NEG^Negative /LPF   EKG 12-lead, complete   Result Value Ref Range    Interpretation ECG Click View Image link to view waveform and result    Internal Medicine Adult IP Consult for [facility] in [facility]: Patient to be seen: Routine within 24 hrs; Call back #: [facility] desk or page [phone]; Vaginal discharge,  "dysuria, pruritis; Consultant may enter orders: Yes    Narrative    Isabelle Mc PA-C     10/26/2018  2:48 PM  Brief Medicine Note    S: Patient is a 17yo female with a hx of psychosis, depression   with suicidal ideation and marijuana use. She was admitted to   [facility] for depression with suicidal ideation. Internal   medicine was consulted for assessment of vaginal discharge,   dysuria and pruritis.     Patient reports having brown vaginal discharge for approximately   one year. She states the discharge will saturate approximately   2-3 panty liners. She denies any bloody discharge. Associated sx   including occasional vaginal pruritis and dysuria, but denies   urinary frequency or urgency, abdominal pain, diarrhea,   hematochezia, melena, or vomiting. Her last BM was yesterday and   she is passing gas normally. She states she sometimes feels   constipated.     She states her LNMP was 2 months ago, and it normally lasts 3-4   days in duration. She states she normally has her menstrual   period every month. She denies ever being sexually active in the   past. She denies hx of pregnancy or hx of STI or UTIs. She has   not been seen by on outside provider for her vaginal discharge in   the past.     Today's vital signs, medications, and nursing notes were   reviewed. Patient had a negative HCG urine test performed on   admission on 10/13/18. CBC showed no signs of leukocytosis or   anemia. CMP without any acute electrolyte abnormalities. Lipid   panel reviewed and WNL.TSH 0.43. Utox positive for Cannabinoids.    Patient's past medical history, allergies, medications, and prior   hospitalizations/surgeries were reviewed; no other pertinent   medical history.     O:  /70 (BP Location: Right arm)  Pulse 79  Temp 97.6  F   (36.4  C) (Tympanic)  Resp 16  Ht 1.6 m (5' 3\")  Wt 65.3 kg   (144 lb)  LMP  (LMP Unknown)  SpO2 99%  BMI 25.51 kg/m2  General: A&O. NAD. Answering questions " appropriately.  Cardiac: RRR, normal S1 and S2. No murmurs, gallops or friction   rubs.   Respiratory: Lungs CTAB.   GI: abdomen is soft and non-tender. +BS. No guarding, rigidity or   rebound ttp.   : exam deferred    A/P:  Psychosis, depression with suicidal ideation, cannabinoid abuse:  -Management per psychiatry team.     Vaginal discharge: Patient with a hx of chronic brown vaginal   discharge x1 year with associated vaginal pruritis and dysuria.   She denies ever being sexually active. HCG qualitative urine from   10/13/18 negative. Abdominal exam is benign without any   suprapubic tenderness, guarding rigidity or rebound tenderness.   She denies any diarrhea or rectal involvement.  -Will have patient perform a self wet prep swab to assess for BV,   yeast, and trichomonas given vaginal discharge.  -Will assess with urinalysis and reflex to culture to assess for   UTI given dysuria.  -Will perform Chlamydia and Gonorrhea testing via urine to assess   for infection.   -Will follow-up on results and initiate therapy if warranted.  -Educated patient on pelvic hygiene   -Instruct patient to follow-up with her PCP or Ob/Gyn following   discharge for follow-up and for formal pelvic examination.     I will follow-up on results. Otherwise no further medical   intervention is required at this time. Please feel free to call   with any questions.     Isabelle Mc PA-C  Hospitalist Service  Pager [phone]       Wet prep   Result Value Ref Range    Specimen Description Genital Vagina     Wet Prep Few  PMNs seen       Wet Prep No motile Trichomonas seen     Wet Prep No yeast seen     Wet Prep No clue cells seen    Chlamydia trachomatis PCR   Result Value Ref Range    Specimen Description Urine     Chlamydia Trachomatis PCR Negative NEG^Negative   Neisseria gonorrhoeae PCR   Result Value Ref Range    Specimen Descrip Urine     N Gonorrhea PCR Negative NEG^Negative   Urine Culture Aerobic Bacterial   Result Value  Ref Range    Specimen Description Midstream Urine     Special Requests Specimen received in preservative     Culture Micro       50,000 to 100,000 colonies/mL  mixed urogenital clifford  Susceptibility testing not routinely done

## 2018-11-02 NOTE — PLAN OF CARE
Problem: Mood Impairment (Depressive Signs/Symptoms) (Adult)  Goal: Improved Mood Symptoms (Depressive Signs/Symptoms)  Outcome: Adequate for Discharge Date Met: 11/02/18  Patient discharge to IR TS, discharge instructions and medications explained to patient with no question asked. Patient verbalized understanding of the discharge instructions.  Belongings sent with patient upon discharge.  Patient to  the prescription drugs from own pharmacy

## 2018-11-02 NOTE — PROGRESS NOTES
Re: Discharge planning    Writer faxed over copy of admission packet to [facility].   Writer faxed over copy of PD to Court with Change of Status Form.  Copies of admission packet and PD provided to primary CTC.    Irene Serra, LPCC, LADC

## 2020-05-20 ENCOUNTER — OFFICE VISIT (OUTPATIENT)
Dept: FAMILY MEDICINE | Facility: CLINIC | Age: 20
End: 2020-05-20
Payer: COMMERCIAL

## 2020-05-20 VITALS
RESPIRATION RATE: 14 BRPM | OXYGEN SATURATION: 100 % | DIASTOLIC BLOOD PRESSURE: 80 MMHG | TEMPERATURE: 97.8 F | SYSTOLIC BLOOD PRESSURE: 122 MMHG | BODY MASS INDEX: 46.77 KG/M2 | HEART RATE: 98 BPM | WEIGHT: 264 LBS

## 2020-05-20 DIAGNOSIS — H65.00 ACUTE SEROUS OTITIS MEDIA, RECURRENCE NOT SPECIFIED, UNSPECIFIED LATERALITY: ICD-10-CM

## 2020-05-20 DIAGNOSIS — H60.393 INFECTIVE OTITIS EXTERNA, BILATERAL: ICD-10-CM

## 2020-05-20 DIAGNOSIS — E66.01 MORBID OBESITY (H): ICD-10-CM

## 2020-05-20 DIAGNOSIS — H61.23 BILATERAL IMPACTED CERUMEN: Primary | ICD-10-CM

## 2020-05-20 DIAGNOSIS — H92.01 RIGHT EAR PAIN: ICD-10-CM

## 2020-05-20 PROCEDURE — 99213 OFFICE O/P EST LOW 20 MIN: CPT | Mod: 25 | Performed by: NURSE PRACTITIONER

## 2020-05-20 PROCEDURE — 69209 REMOVE IMPACTED EAR WAX UNI: CPT | Mod: 50 | Performed by: NURSE PRACTITIONER

## 2020-05-20 RX ORDER — CLOZAPINE 100 MG/1
200 TABLET ORAL DAILY
COMMUNITY

## 2020-05-20 RX ORDER — CIPROFLOXACIN AND DEXAMETHASONE 3; 1 MG/ML; MG/ML
4 SUSPENSION/ DROPS AURICULAR (OTIC) 2 TIMES DAILY
Qty: 4 ML | Refills: 0 | Status: SHIPPED | OUTPATIENT
Start: 2020-05-20 | End: 2020-05-20

## 2020-05-20 RX ORDER — CIPROFLOXACIN AND DEXAMETHASONE 3; 1 MG/ML; MG/ML
4 SUSPENSION/ DROPS AURICULAR (OTIC) 2 TIMES DAILY
Qty: 4 ML | Refills: 0 | Status: SHIPPED | OUTPATIENT
Start: 2020-05-20

## 2020-05-20 RX ORDER — SERTRALINE HYDROCHLORIDE 100 MG/1
100 TABLET, FILM COATED ORAL DAILY
COMMUNITY

## 2020-05-20 ASSESSMENT — PAIN SCALES - GENERAL: PAINLEVEL: SEVERE PAIN (7)

## 2020-05-20 NOTE — PROGRESS NOTES
Subjective     Nelida Montano is a 20 year old female who presents to clinic today for the following health issues:    Patient patient comes in today for assessment of right ear pain.  She has a history of swimmer's ear has been treated with drops multiple times.  She has some ear fullness and pain on the right some mild pain on the left.  She is negative for fevers or chills, headaches or visual changes.  No shortness of breath sinus congestion or respiratory congestion.  Pain when touching the ear nothing really alleviates the pain.  She is done nothing for pain in terms of NSAIDs or Tylenol.  She has noticed some clear fluid draining from the right ear.  No bloody drainage however.    HPI   Concern - Right ear pain   Onset: x2 weeks     Description:   Throbbing pain     Intensity: severe    Progression of Symptoms:  worsening    Accompanying Signs & Symptoms:  Clear Draining     Previous history of similar problem:   Yes states she has had swimmers ear multiple times     Precipitating factors:   Worsened by: touching ear and eating     Alleviating factors:  Improved by: Nothing     Therapies Tried and outcome: Nothing           Patient Active Problem List   Diagnosis     Psychosis (H)     Morbid obesity (H)     History reviewed. No pertinent surgical history.    Social History     Tobacco Use     Smoking status: Current Every Day Smoker     Packs/day: 2.00     Smokeless tobacco: Never Used     Tobacco comment: vape   Substance Use Topics     Alcohol use: No     History reviewed. No pertinent family history.      Current Outpatient Medications   Medication Sig Dispense Refill     amoxicillin-clavulanate (AUGMENTIN) 875-125 MG tablet Take 1 tablet by mouth 2 times daily 20 tablet 0     ciprofloxacin-dexamethasone (CIPRODEX) 0.3-0.1 % otic suspension Place 4 drops into both ears 2 times daily 4 mL 0     cloZAPine (CLOZARIL) 100 MG tablet Take 200 mg by mouth daily       sertraline (ZOLOFT) 100 MG tablet  Take 100 mg by mouth daily       melatonin 3 MG tablet Take 1 tablet (3 mg) by mouth every evening (Patient not taking: Reported on 5/20/2020) 30 tablet 0     risperiDONE (RISPERDAL) 4 MG tablet Take 1 tablet (4 mg) by mouth At Bedtime (Patient not taking: Reported on 5/20/2020) 30 tablet 0     risperiDONE microspheres (RISPERDAL CONSTA) 50 MG injection Inject 2 mLs (50 mg) into the muscle every 14 days (Patient not taking: Reported on 5/20/2020) 2 each 0     No Known Allergies    Reviewed and updated as needed this visit by Provider  Tobacco  Allergies  Meds  Problems  Med Hx  Surg Hx  Fam Hx         Review of Systems   Constitutional, HEENT, cardiovascular, pulmonary, gi and gu systems are negative, except as otherwise noted.      Objective    /80 (BP Location: Right arm, Patient Position: Sitting, Cuff Size: Adult Large)   Pulse 98   Temp 97.8  F (36.6  C) (Temporal)   Resp 14   Wt 119.7 kg (264 lb)   SpO2 100%   Breastfeeding No   BMI 46.77 kg/m    Body mass index is 46.77 kg/m .  Physical Exam   GENERAL: healthy, alert and no distress  EYES: Eyes grossly normal to inspection  HENT: normal cephalic/atraumatic, ears initially impacted with cerumen room and were able to visualize canals and TM peas once irrigation was completed: Right ear: erythematous and red and boggy canal, left ear: red and boggy canal, nose and mouth without ulcers or lesions, oropharynx clear and oral mucous membranes moist  NECK: no asymmetry, masses, or scars  RESP: Normal effort  ABDOMEN: soft, nontender  SKIN: no suspicious lesions or rashes  NEURO: Normal strength and tone, mentation intact and speech normal  PSYCH: mentation appears normal and affect flat    Diagnostic Test Results:  Labs reviewed in Epic        Assessment & Plan     1. Bilateral impacted cerumen  -Wax was removed with the elephant irrigation system.  I was able to evaluate both TM peas and ear canals both of which were red and boggy, tympanic  membrane on the right was dark red and swollen.  - REMOVE IMPACTED CERUMEN  - ciprofloxacin-dexamethasone (CIPRODEX) 0.3-0.1 % otic suspension; Place 4 drops into both ears 2 times daily  Dispense: 4 mL; Refill: 0    2. Morbid obesity (H)  -Patient working on improving diet and increasing activity.    3. Infective otitis externa, bilateral  -Ciprodex to both ears for 10 days.  - ciprofloxacin-dexamethasone (CIPRODEX) 0.3-0.1 % otic suspension; Place 4 drops into both ears 2 times daily  Dispense: 4 mL; Refill: 0    4. Right ear pain  -She has a bilateral otitis externa we will treat her with Ciprodex drops.  - ciprofloxacin-dexamethasone (CIPRODEX) 0.3-0.1 % otic suspension; Place 4 drops into both ears 2 times daily  Dispense: 4 mL; Refill: 0    5. Acute serous otitis media, recurrence not specified, unspecified laterality  -We will treat the acute otitis media on the right with an oral broad-spectrum antibiotic.  - amoxicillin-clavulanate (AUGMENTIN) 875-125 MG tablet; Take 1 tablet by mouth 2 times daily  Dispense: 20 tablet; Refill: 0    -Discussed signs and symptoms to report for worsening symptoms to include worsening pain, fever over 100.4, headache body aches or severe pain.  She is to present to ER for any severe pain or high fever.    -Extensive home care instructions provided to patient reminded her to use probiotics to help prevent yeast infections lymph the antibiotic.    - Patient instructed to call clinic with new or worsening symptoms prior to her next follow up appointment.       -Patient verbalized understanding of plan of care and is agreement current plan of care.  Yesterday's    Return in about 2 weeks (around 6/3/2020), or if symptoms worsen or fail to improve, for Recheck if symptoms worsen or fail to improve.    Stanislav Starr NP  [facility]

## 2020-05-20 NOTE — PATIENT INSTRUCTIONS
Patient Education     External Ear Infection (Adult)    External otitis (also called  swimmer s ear ) is an infection in the ear canal. It is often caused by bacteria or fungus. It can occur a few days after water gets trapped in the ear canal (from swimming or bathing). It can also occur after cleaning too deeply in the ear canal with a cotton swab or other object. Sometimes, hair care products get into the ear canal and cause this problem.  Symptoms can include pain, fever, itching, redness, drainage, or swelling of the ear canal. Temporary hearing loss may also occur.  Home care    Do not try to clean the ear canal. This can push pus and bacteria deeper into the canal.    Use prescribed ear drops as directed. These help reduce swelling and fight the infection. If an ear wick was placed in the ear canal, apply drops right onto the end of the wick. The wick will draw the medicine into the ear canal even if it is swollen closed.    A cotton ball may be loosely placed in the outer ear to absorb any drainage.    You may use acetaminophen or ibuprofen to control pain, unless another medicine was prescribed. Note: If you have chronic liver or kidney disease or ever had a stomach ulcer or GI bleeding, talk to your healthcare provider before taking any of these medicines.    Do not allow water to get into your ear when bathing. Also, don't swim until the infection has cleared.  Prevention    Keep your ears dry. This helps lower the risk of infection. Dry your ears with a towel or hair dryer after getting wet. Also, use ear plugs when swimming.    Do not stick any objects in the ear to remove wax.    If you feel water trapped in your ear, use ear drops right away. You can get these drops over the counter at most drugstores. They work by removing water from the ear canal.  Follow-up care  Follow up with your healthcare provider in 1 week, or as advised.  When to seek medical advice  Call your healthcare provider right  away if any of these occur:    Ear pain becomes worse or doesn t improve after 3 days of treatment    Redness or swelling of the outer ear occurs or gets worse    Headache    Painful or stiff neck    Drowsiness or confusion    Fever of 100.4 F (38 C) or higher, or as directed by your healthcare provider    Seizure  Date Last Reviewed: 10/1/2017    1057-4236 [employer]. [address] [place] [address]. All rights reserved. This information is not intended as a substitute for professional medical care. Always follow your healthcare professional's instructions.           Patient Education     Otitis Media (Middle-Ear Infection) in Adults  Otitis media is another name for a middle-ear infection. It means an infection behind your eardrum. This kind of ear infection can happen after any condition that keeps fluid from draining from the middle ear. These conditions include allergies, a cold, a sore throat, or a respiratory infection.  Middle-ear infections are common in children, but they can also happen in adults. An ear infection in an adult may mean a more serious problem than in a child. So you may need additional tests. If you have an ear infection, you should see your health care provider for treatment.  What are the types of middle-ear infections?  Infections can affect the middle ear in several ways. They are:    Acute otitis media. This middle-ear infection occurs suddenly. It causes swelling and redness. Fluid and mucus become trapped inside the ear. You can have a fever and ear pain.    Otitis media with effusion. Fluid (effusion) and mucus build up in the middle ear after the infection goes away. You may feel like your middle ear is full. This can continue for months and may affect your hearing.    Chronic otitis media with effusion. Fluid (effusion) remains in the middle ear for a long time. Or it builds up again and again, even though there is no infection. This type of middle-ear  infection may be hard to treat. It may also affect your hearing.  Who is more likely to get a middle-ear infection?  You are more likely to get an ear infection if you:    Smoke or are around someone who smokes    Have seasonal or year-round allergy symptoms    Have a cold or other upper respiratory infection  What causes a middle-ear infection?  The middle ear connects to the throat by a canal called the eustachian tube. This tube helps even out the pressure between the outer ear and the inner ear. A cold or allergy can irritate the tube or cause the area around it to swell. This can keep fluid from draining from the middle ear. The fluid builds up behind the eardrum. Bacteria and viruses can grow in this fluid. The bacteria and viruses cause the middle-ear infection.  What are the symptoms of a middle-ear infection?  Common symptoms of a middle-ear infection in adults are:    Pain in 1 or both ears    Drainage from the ear    Muffled hearing    Sore throat   You may also have a fever. Rarely, your balance can be affected.  These symptoms may be the same as for other conditions. It s important to talk with your health care provider if you think you have a middle-ear infection. If you have a high fever, severe pain behind your ear, or paralysis in your face, see your provider as soon as you can.  How is a middle-ear infection diagnosed?  Your health care provider will take a medical history and do a physical exam. He or she will look at the outer ear and eardrum with an otoscope. The otoscope is a lighted tool that lets your provider see inside the ear. A pneumatic otoscope blows a puff of air into the ear to check how well your eardrum moves. If you eardrum doesn t move well, it may mean you have fluid behind it.  Your provider may also do a test called tympanometry. This test tells how well the middle ear is working. It can find any changes in pressure in the middle ear. Your provider may test your hearing with a  tuning fork.  How is a middle-ear infection treated?  A middle-ear infection may be treated with:    Antibiotics, taken by mouth or as ear drops    Medication for pain    Decongestants, antihistamines, or nasal steroids  Your health care provider may also have you try autoinsufflation. This helps adjust the air pressure in your ear. For this, you pinch your nose and gently exhale. This forces air back through the eustachian tube.  The exact treatment for your ear infection will depend on the type of infection you have. In general, if your symptoms don t get better in 48 to 72 hours, contact your health care provider.  Middle-ear infections can cause long-term problems if not treated. They can lead to:    Infection in other parts of the head    Permanent hearing loss    Paralysis of a nerve in your face  If you have a middle-ear infection that doesn t get better, you may need to see an ear, nose, and throat specialist (otolaryngologist). You may need a CT scan or MRI to check for head and neck cancer.  Ear tubes  Sometimes fluid stays in the middle ear even after you take antibiotics and the infection goes away. In this case, your health care provider may suggest that a small tube be placed in your ear. The tube is put at the opening of the eardrum. The tube keeps fluid from building up and relieves pressure in the middle ear. It can also help you hear better. This surgery is called myringotomy. It is not often done in adults.  The tubes usually fall out on their own after 6 months to a year.    [id number] [employer]. [address] [place] [address], [place] [address]. All rights reserved. This information is not intended as a substitute for professional medical care. Always follow your healthcare professional's instructions.

## 2021-05-29 ENCOUNTER — RECORDS - HEALTHEAST (OUTPATIENT)
Dept: ADMINISTRATIVE | Facility: CLINIC | Age: 21
End: 2021-05-29

## 2021-06-01 ENCOUNTER — HOSPITAL ENCOUNTER (EMERGENCY)
Facility: CLINIC | Age: 21
Discharge: HOME | End: 2021-06-01
Attending: NURSE PRACTITIONER | Admitting: NURSE PRACTITIONER
Payer: COMMERCIAL

## 2021-06-01 VITALS
BODY MASS INDEX: 48.76 KG/M2 | TEMPERATURE: 98.4 F | HEIGHT: 62 IN | WEIGHT: 265 LBS | HEART RATE: 111 BPM | DIASTOLIC BLOOD PRESSURE: 95 MMHG | RESPIRATION RATE: 10 BRPM | OXYGEN SATURATION: 100 % | SYSTOLIC BLOOD PRESSURE: 138 MMHG

## 2021-06-01 DIAGNOSIS — R55 SYNCOPE: ICD-10-CM

## 2021-06-01 DIAGNOSIS — R79.89 LOW TSH LEVEL: ICD-10-CM

## 2021-06-01 PROBLEM — R45.851 SUICIDAL IDEATION: Status: ACTIVE | Noted: 2019-03-24

## 2021-06-01 PROBLEM — F10.10 ALCOHOL ABUSE: Status: ACTIVE | Noted: 2021-06-01

## 2021-06-01 PROBLEM — F20.3 SCHIZOPHRENIA, UNDIFFERENTIATED (H): Status: ACTIVE | Noted: 2018-10-01

## 2021-06-01 PROBLEM — F12.10 CANNABIS ABUSE: Status: ACTIVE | Noted: 2021-06-01

## 2021-06-01 PROBLEM — T14.91XA SUICIDE ATTEMPT (H): Status: ACTIVE | Noted: 2019-12-01

## 2021-06-01 PROBLEM — R44.0 AUDITORY HALLUCINATIONS: Status: ACTIVE | Noted: 2021-06-01

## 2021-06-01 PROBLEM — J45.909 ASTHMA: Status: ACTIVE | Noted: 2021-06-01

## 2021-06-01 PROBLEM — F60.3 BORDERLINE PERSONALITY DISORDER (H): Status: ACTIVE | Noted: 2021-06-01

## 2021-06-01 LAB
ANION GAP SERPL CALCULATED.3IONS-SCNC: 8 MMOL/L (ref 3–14)
BUN SERPL-MCNC: 6 MG/DL (ref 7–30)
CALCIUM SERPL-MCNC: 8.4 MG/DL (ref 8.5–10.1)
CHLORIDE SERPL-SCNC: 112 MMOL/L (ref 94–109)
CO2 SERPL-SCNC: 22 MMOL/L (ref 20–32)
CREAT SERPL-MCNC: 0.89 MG/DL (ref 0.52–1.04)
ERYTHROCYTE [DISTWIDTH] IN BLOOD BY AUTOMATED COUNT: 15 % (ref 10–15)
GFR SERPL CREATININE-BSD FRML MDRD: >90 ML/MIN/{1.73_M2}
GLUCOSE SERPL-MCNC: 91 MG/DL (ref 70–99)
HCG UR QL: NEGATIVE
HCT VFR BLD AUTO: 40.3 % (ref 35–47)
HGB BLD-MCNC: 12.6 G/DL (ref 11.7–15.7)
INTERPRETATION ECG - MUSE: NORMAL
MAGNESIUM SERPL-MCNC: 1.7 MG/DL (ref 1.6–2.3)
MCH RBC QN AUTO: 27.9 PG (ref 26.5–33)
MCHC RBC AUTO-ENTMCNC: 31.3 G/DL (ref 31.5–36.5)
MCV RBC AUTO: 89 FL (ref 78–100)
PLATELET # BLD AUTO: 227 10E9/L (ref 150–450)
POTASSIUM SERPL-SCNC: 3.2 MMOL/L (ref 3.4–5.3)
RBC # BLD AUTO: 4.51 10E12/L (ref 3.8–5.2)
SODIUM SERPL-SCNC: 142 MMOL/L (ref 133–144)
T4 FREE SERPL-MCNC: 0.78 NG/DL (ref 0.76–1.46)
TROPONIN I SERPL-MCNC: <0.015 UG/L (ref 0–0.04)
TSH SERPL DL<=0.005 MIU/L-ACNC: 0.38 MU/L (ref 0.4–4)
WBC # BLD AUTO: 10.2 10E9/L (ref 4–11)

## 2021-06-01 PROCEDURE — 85027 COMPLETE CBC AUTOMATED: CPT | Performed by: NURSE PRACTITIONER

## 2021-06-01 PROCEDURE — 80048 BASIC METABOLIC PNL TOTAL CA: CPT | Performed by: NURSE PRACTITIONER

## 2021-06-01 PROCEDURE — 81025 URINE PREGNANCY TEST: CPT | Performed by: NURSE PRACTITIONER

## 2021-06-01 PROCEDURE — 99284 EMERGENCY DEPT VISIT MOD MDM: CPT | Mod: 25

## 2021-06-01 PROCEDURE — 96361 HYDRATE IV INFUSION ADD-ON: CPT

## 2021-06-01 PROCEDURE — 84443 ASSAY THYROID STIM HORMONE: CPT | Performed by: NURSE PRACTITIONER

## 2021-06-01 PROCEDURE — 84484 ASSAY OF TROPONIN QUANT: CPT | Performed by: NURSE PRACTITIONER

## 2021-06-01 PROCEDURE — 96360 HYDRATION IV INFUSION INIT: CPT

## 2021-06-01 PROCEDURE — 83735 ASSAY OF MAGNESIUM: CPT | Performed by: NURSE PRACTITIONER

## 2021-06-01 PROCEDURE — 93005 ELECTROCARDIOGRAM TRACING: CPT

## 2021-06-01 PROCEDURE — 258N000003 HC RX IP 258 OP 636: Performed by: NURSE PRACTITIONER

## 2021-06-01 PROCEDURE — 84439 ASSAY OF FREE THYROXINE: CPT | Performed by: NURSE PRACTITIONER

## 2021-06-01 RX ADMIN — SODIUM CHLORIDE 1000 ML: 9 INJECTION, SOLUTION INTRAVENOUS at 16:30

## 2021-06-01 ASSESSMENT — ENCOUNTER SYMPTOMS
DYSURIA: 0
CHILLS: 0
ABDOMINAL PAIN: 0
FEVER: 0
SHORTNESS OF BREATH: 0

## 2021-06-01 ASSESSMENT — MIFFLIN-ST. JEOR: SCORE: 1910.28

## 2021-06-01 NOTE — DISCHARGE INSTRUCTIONS
I have placed an order for a cardiac monitor that you can wear for a week.  They will call you in the next day or so to schedule placement.  You should follow-up with both neurology and cardiologist regarding your recurrent episodes.  Again your evaluation today showed no acute findings.

## 2021-06-01 NOTE — ED NOTES
Ivette, pt's , updated on plan of care and discharge and to assist pt with neurology and cardiology visits

## 2021-06-01 NOTE — ED TRIAGE NOTES
Pt presents from home by EMS where she resides with a friend, state she has a  who checks on her daily and her  called EMS due to pt having syncopal episodes. Pt states for the past 6 months she will be standing, feels her legs go out underneath her and she has a sycopal episode. Pt also reports some twitching in her body. Pt reports today she had multiple episodes, fell onto L leg and having L knee pain and ankle pain. Pt has not been worked up for this condition in the past. Pt has hx of schitzophrenia but no other known medical probelems

## 2021-06-01 NOTE — ED PROVIDER NOTES
History     Chief Complaint:  Syncope      HPI   Nelida Montano is a 21 year old female who presents with request for evaluation of syncopal episodes.  She states for last 2-1/2 to 3 months she has been having multiple syncopal/near syncopal events throughout the day.  She does note that she had similar occurrences approximately 3 years ago when she had anemia.  She does state she has heavy menstrual cycles and is just finishing her current menstrual cycle.  She also has been told she is prediabetic.  She denies any nausea or vomiting.  She was not outside in the heat today.  There is no numbness weakness or tingling.  No focal neurologic deficits or concerns.  No other concerns or complaints at this time.    Review of Systems   Constitutional: Negative for chills and fever.   Respiratory: Negative for shortness of breath.    Cardiovascular: Negative for chest pain.   Gastrointestinal: Negative for abdominal pain.   Genitourinary: Negative for dysuria.   Neurological: Positive for syncope.   All other systems reviewed and are negative.        Allergies:    No Known Allergies      Medications:      amoxicillin-clavulanate (AUGMENTIN) 875-125 MG tablet  ciprofloxacin-dexamethasone (CIPRODEX) 0.3-0.1 % otic suspension  cloZAPine (CLOZARIL) 100 MG tablet  melatonin 3 MG tablet  risperiDONE (RISPERDAL) 4 MG tablet  risperiDONE microspheres (RISPERDAL CONSTA) 50 MG injection  sertraline (ZOLOFT) 100 MG tablet        Past Medical History:      History reviewed. No pertinent past medical history.  Patient Active Problem List    Diagnosis Date Noted     Auditory hallucinations 06/01/2021     Priority: Medium     Borderline personality disorder (H) 06/01/2021     Priority: Medium     Cannabis abuse 06/01/2021     Priority: Medium     Alcohol abuse 06/01/2021     Priority: Medium     Asthma 06/01/2021     Priority: Medium     Morbid obesity (H) 05/20/2020     Priority: Medium     Suicide attempt (H) 12/01/2019      "Priority: Medium     Formatting of this note might be different from the original.  Multiple in past per review of EMR       Suicidal ideation 03/24/2019     Priority: Medium     Psychosis (H) 10/09/2018     Priority: Medium     Schizophrenia, undifferentiated (H) 10/01/2018     Priority: Medium     Formatting of this note might be different from the original.  Possible diagnosis listed on 10/2018 psych d/c summary          Past Surgical History:      History reviewed. No pertinent surgical history.    Family History:      No family history on file.    Social History:  Presents alone  vapes  Denies drug use    Physical Exam     Patient Vitals for the past 24 hrs:   BP Temp Temp src Pulse Resp SpO2 Height Weight   06/01/21 1615 (!) 153/104 -- -- 125 28 97 % -- --   06/01/21 1600 -- -- -- 115 26 100 % -- --   06/01/21 1556 -- -- -- -- -- -- 1.575 m (5' 2\") 120.2 kg (265 lb)   06/01/21 1542 (!) 143/111 98.4  F (36.9  C) Oral 112 16 100 % -- --       Physical Exam  General: Alert, No obvious discomfort, well kept, morbidly obese  Eyes: PERRL, conjunctivae pink no scleral icterus or conjunctival injection  ENT:   Moist mucus membranes, posterior oropharynx clear without erythema or exudates, No lymphadenopathy, Normal voice  Resp:  Lungs clear to auscultation bilaterally, no crackles/rubs/wheezes. Good air movement  CV:  tachycardia, no murmurs/rubs/gallops  GI:  Abdomen soft and non-distended.  Normoactive BS.  No tenderness, guarding or rebound, No masses  Skin:  Warm, dry.  No rashes or petechiae  Musculoskeletal: No peripheral edema or calf tenderness, Normal gross ROM   Neuro: Alert and oriented to person/place/time, normal sensation  Psychiatric: Normal affect, cooperative, good eye contact    Emergency Department Course   ECG:  ECG taken at 1605, ECG read at 1607     No significant change as compared to prior, dated 10/17/18.  Rate 113 bpm. ID interval 178 ms. QRS duration 68 ms. QT/QTc 292/400 ms. P-R-T axes 58 6 " 140.     Imaging:  Leadless EKG Monitor 3 to 7 Days    (Results Pending)       Laboratory:  Recent Results (from the past 8 hour(s))   CBC (+ platelets, no diff)    Collection Time: 06/01/21  3:54 PM   Result Value Ref Range    WBC 10.2 4.0 - 11.0 10e9/L    RBC Count 4.51 3.8 - 5.2 10e12/L    Hemoglobin 12.6 11.7 - 15.7 g/dL    Hematocrit 40.3 35.0 - 47.0 %    MCV 89 78 - 100 fl    MCH 27.9 26.5 - 33.0 pg    MCHC 31.3 (L) 31.5 - 36.5 g/dL    RDW 15.0 10.0 - 15.0 %    Platelet Count 227 150 - 450 10e9/L   Basic metabolic panel    Collection Time: 06/01/21  3:54 PM   Result Value Ref Range    Sodium 142 133 - 144 mmol/L    Potassium 3.2 (L) 3.4 - 5.3 mmol/L    Chloride 112 (H) 94 - 109 mmol/L    Carbon Dioxide 22 20 - 32 mmol/L    Anion Gap 8 3 - 14 mmol/L    Glucose 91 70 - 99 mg/dL    Urea Nitrogen 6 (L) 7 - 30 mg/dL    Creatinine 0.89 0.52 - 1.04 mg/dL    GFR Estimate >90 >60 mL/min/[1.73_m2]    GFR Estimate If Black >90 >60 mL/min/[1.73_m2]    Calcium 8.4 (L) 8.5 - 10.1 mg/dL   Troponin I    Collection Time: 06/01/21  3:54 PM   Result Value Ref Range    Troponin I ES <0.015 0.000 - 0.045 ug/L   TSH with free T4 reflex    Collection Time: 06/01/21  3:54 PM   Result Value Ref Range    TSH 0.38 (L) 0.40 - 4.00 mU/L   Magnesium    Collection Time: 06/01/21  3:54 PM   Result Value Ref Range    Magnesium 1.7 1.6 - 2.3 mg/dL   T4 free    Collection Time: 06/01/21  3:54 PM   Result Value Ref Range    T4 Free 0.78 0.76 - 1.46 ng/dL   EKG 12-lead, tracing only    Collection Time: 06/01/21  4:05 PM   Result Value Ref Range    Interpretation ECG Click View Image link to view waveform and result    HCG qualitative urine    Collection Time: 06/01/21  4:24 PM   Result Value Ref Range    HCG Qual Urine Negative NEG^Negative       Emergency Department Course:    Reviewed:  I reviewed nursing notes, vitals, past history and care everywhere    Assessments:   I obtained history and examined the patient as noted above.    I rechecked  the patient and explained findings.     Recheck.  I discussed the laboratory results with the patient and she understands.  The patient felt improved after the above interventions.  The patient will be discharged home to follow up with primary care doctor per discharge instructions.  Indications for return to the ED were discussed and the patient understands.  All questions were answered prior to discharge.          Interventions:    Medications   0.9% sodium chloride BOLUS (1,000 mLs Intravenous New Bag 6/1/21 1630)       Disposition:  The patient was discharged to home.    Impression & Plan      Medical Decision Making:  ,Nelida Montano is a 21 year old female who presents with a history and clinical exam consistent with syncope.  She notes that is a recurring problem.  The differential for syncope is broad and includes etiologies such as cardiac arrhythmia, ACS, aortic stenosis, HOCM, PE, orthostatic hypotension, drugs, situational, carotid hypersensitivity, seizure, TIA, stroke, vasovagal. There are no signs of a concerning etiology for syncope at this point. In addition, there is no family history of sudden death, no chest pain, no seizure activity or post-ictal period, no murmur, and no signs of orthostasis here, no focal neurologic symptoms, and no complaints of concerning headache.  She did have a low TSH however a normal free T4.  The workup here is otherwise negative and the physical exam is re-assurring. Follow up with PCP in 3-5 days for continued symptoms or sooner if worsening.  Consider neurology for continued symptoms.  Return to the ED if develops numbness, weakness, shortness of breath, visual changes, or for other concerns.      Covid-19  Nelida Montano was evaluated during a global COVID-19 pandemic, which necessitated consideration that the patient might be at risk for infection with the SARS-CoV-2 virus that causes COVID-19.   Applicable protocols for evaluation were  followed during the patient's care.   COVID-19 was considered as part of the patient's evaluation.     Diagnosis:    ICD-10-CM    1. Syncope  R55 Leadless EKG Monitor 3 to 7 Days   2. Low TSH level  R79.89        Discharge Medications:  New Prescriptions    No medications on file         Scribe Disclosure:  Charles FISHMAN APRN CNP, am serving as a scribe at 4:09 PM on 6/1/2021 to document services personally performed by Charles Nelson APRN based on my observations and the provider's statements to me.      Charles Nelson APRN CNP  06/01/21 3648

## 2021-06-01 NOTE — ED NOTES
Bed: FT03  Expected date:   Expected time:   Means of arrival:   Comments:  [facility] [place] [facility] - 21F syncope eta 1543

## 2021-06-01 NOTE — ED NOTES
[facility] asked to arrange transport for pt. Pt requesting a cab ride home, states she normally takes a medi cab at other hospitals. Pt offered regular cab or [facility] wheelchair transport and she states she is comfortable in a regular cab

## 2021-06-01 NOTE — ED PROVIDER NOTES
"  History   Chief Complaint:  Syncope       HPI   Nelida Montano is a 21 year old female with history of *** who presents with ***.        Review of Systems  ***    Allergies:  No Known Drug Allergies     Medications:  Clozapine  Sertraline  Risperdal    Past Medical History:    Psychosis  Morbid obesity   Schizophrenia  Asthma  Polysubstance abuse  Borderline personality disorder    Family History:    Seizures  Stroke    Social History:  The patient presents to the emergency department ***.  ***    Physical Exam     Patient Vitals for the past 24 hrs:   BP Temp Temp src Pulse Resp SpO2 Height Weight   06/01/21 1556 -- -- -- -- -- -- 1.575 m (5' 2\") 120.2 kg (265 lb)   06/01/21 1542 (!) 143/111 98.4  F (36.9  C) Oral 112 16 100 % -- --       Physical Exam  ***    Emergency Department Course     ECG:  ***     Imaging:  ***  No orders to display       Laboratory:  {aglabs:803214}    Procedures  ***    Emergency Department Course:    Reviewed:  ***: I reviewed the patient's nursing notes, vitals, past medical records***, Care Everywhere.     Assessments:  ***: I assessed the patient and performed a physical exam at this time.  ***: I reassessed the patient and informed them of their workup thus far.     Consults:   ***: ***    Interventions:  ***  Medications - No data to display    Disposition:  {[facility] Dispo:351787}      Impression & Plan     ***     {[facility] Quality Projects:002279}    {trauma activation?:927431::\" \"}    CMS Diagnoses: {Sepsis/Septic Shock/Stemi/Stroke:005675::\" \"}       Medical Decision Making:  ***     Critical Care Time: was *** minutes for this patient excluding procedures    Diagnosis:  No diagnosis found.    Discharge Medications:  New Prescriptions    No medications on file       Scribe Disclosure:  I, Jose Manuel Hagen, am serving as a scribe at 4:09 PM on 6/1/2021 to document services personally performed by Charles Nelson APRN**** based on my observations and the " provider's statements to me.

## 2021-06-29 ENCOUNTER — HOSPITAL ENCOUNTER (OUTPATIENT)
Dept: CARDIOLOGY | Facility: CLINIC | Age: 21
Discharge: HOME | End: 2021-06-29
Attending: NURSE PRACTITIONER | Admitting: NURSE PRACTITIONER
Payer: COMMERCIAL

## 2021-06-29 DIAGNOSIS — R55 SYNCOPE: ICD-10-CM

## 2021-06-29 PROCEDURE — 93244 EXT ECG>48HR<7D REV&INTERPJ: CPT | Performed by: INTERNAL MEDICINE

## 2021-06-29 PROCEDURE — 93242 EXT ECG>48HR<7D RECORDING: CPT

## 2022-01-01 ENCOUNTER — HOSPITAL ENCOUNTER (EMERGENCY)
Facility: CLINIC | Age: 22
Discharge: HOME | End: 2022-03-10
Attending: EMERGENCY MEDICINE | Admitting: EMERGENCY MEDICINE
Payer: COMMERCIAL

## 2022-01-01 ENCOUNTER — APPOINTMENT (OUTPATIENT)
Dept: GENERAL RADIOLOGY | Facility: CLINIC | Age: 22
End: 2022-01-01
Attending: EMERGENCY MEDICINE
Payer: COMMERCIAL

## 2022-01-01 VITALS
TEMPERATURE: 97.4 F | BODY MASS INDEX: 46.07 KG/M2 | HEIGHT: 63 IN | HEART RATE: 97 BPM | SYSTOLIC BLOOD PRESSURE: 136 MMHG | WEIGHT: 260 LBS | RESPIRATION RATE: 18 BRPM | DIASTOLIC BLOOD PRESSURE: 72 MMHG | OXYGEN SATURATION: 100 %

## 2022-01-01 DIAGNOSIS — R19.7 DIARRHEA, UNSPECIFIED TYPE: ICD-10-CM

## 2022-01-01 DIAGNOSIS — R11.2 NON-INTRACTABLE VOMITING WITH NAUSEA, UNSPECIFIED VOMITING TYPE: ICD-10-CM

## 2022-01-01 DIAGNOSIS — R07.89 ATYPICAL CHEST PAIN: ICD-10-CM

## 2022-01-01 LAB
ANION GAP SERPL CALCULATED.3IONS-SCNC: 4 MMOL/L (ref 3–14)
ATRIAL RATE - MUSE: 97 BPM
BASOPHILS # BLD AUTO: 0 10E3/UL (ref 0–0.2)
BASOPHILS NFR BLD AUTO: 0 %
BUN SERPL-MCNC: 8 MG/DL (ref 7–30)
CALCIUM SERPL-MCNC: 9 MG/DL (ref 8.5–10.1)
CHLORIDE BLD-SCNC: 107 MMOL/L (ref 94–109)
CO2 SERPL-SCNC: 28 MMOL/L (ref 20–32)
CREAT SERPL-MCNC: 1.12 MG/DL (ref 0.52–1.04)
DIASTOLIC BLOOD PRESSURE - MUSE: NORMAL MMHG
EOSINOPHIL # BLD AUTO: 0.2 10E3/UL (ref 0–0.7)
EOSINOPHIL NFR BLD AUTO: 2 %
ERYTHROCYTE [DISTWIDTH] IN BLOOD BY AUTOMATED COUNT: 13.9 % (ref 10–15)
GFR SERPL CREATININE-BSD FRML MDRD: 71 ML/MIN/1.73M2
GLUCOSE BLD-MCNC: 96 MG/DL (ref 70–99)
HCT VFR BLD AUTO: 39.8 % (ref 35–47)
HGB BLD-MCNC: 12.5 G/DL (ref 11.7–15.7)
IMM GRANULOCYTES # BLD: 0 10E3/UL
IMM GRANULOCYTES NFR BLD: 0 %
INTERPRETATION ECG - MUSE: NORMAL
LYMPHOCYTES # BLD AUTO: 3.1 10E3/UL (ref 0.8–5.3)
LYMPHOCYTES NFR BLD AUTO: 34 %
MCH RBC QN AUTO: 29.5 PG (ref 26.5–33)
MCHC RBC AUTO-ENTMCNC: 31.4 G/DL (ref 31.5–36.5)
MCV RBC AUTO: 94 FL (ref 78–100)
MONOCYTES # BLD AUTO: 0.6 10E3/UL (ref 0–1.3)
MONOCYTES NFR BLD AUTO: 7 %
NEUTROPHILS # BLD AUTO: 5.1 10E3/UL (ref 1.6–8.3)
NEUTROPHILS NFR BLD AUTO: 57 %
NRBC # BLD AUTO: 0 10E3/UL
NRBC BLD AUTO-RTO: 0 /100
P AXIS - MUSE: 42 DEGREES
PLATELET # BLD AUTO: 349 10E3/UL (ref 150–450)
POTASSIUM BLD-SCNC: 4.3 MMOL/L (ref 3.4–5.3)
PR INTERVAL - MUSE: 136 MS
QRS DURATION - MUSE: 66 MS
QT - MUSE: 352 MS
QTC - MUSE: 447 MS
R AXIS - MUSE: 1 DEGREES
RBC # BLD AUTO: 4.24 10E6/UL (ref 3.8–5.2)
SODIUM SERPL-SCNC: 139 MMOL/L (ref 133–144)
SYSTOLIC BLOOD PRESSURE - MUSE: NORMAL MMHG
T AXIS - MUSE: 50 DEGREES
TROPONIN I SERPL HS-MCNC: <3 NG/L
VENTRICULAR RATE- MUSE: 97 BPM
WBC # BLD AUTO: 9 10E3/UL (ref 4–11)

## 2022-01-01 PROCEDURE — 85025 COMPLETE CBC W/AUTO DIFF WBC: CPT | Performed by: EMERGENCY MEDICINE

## 2022-01-01 PROCEDURE — 96374 THER/PROPH/DIAG INJ IV PUSH: CPT

## 2022-01-01 PROCEDURE — 36415 COLL VENOUS BLD VENIPUNCTURE: CPT | Performed by: EMERGENCY MEDICINE

## 2022-01-01 PROCEDURE — 80048 BASIC METABOLIC PNL TOTAL CA: CPT | Performed by: EMERGENCY MEDICINE

## 2022-01-01 PROCEDURE — 96361 HYDRATE IV INFUSION ADD-ON: CPT

## 2022-01-01 PROCEDURE — 93005 ELECTROCARDIOGRAM TRACING: CPT

## 2022-01-01 PROCEDURE — 71046 X-RAY EXAM CHEST 2 VIEWS: CPT

## 2022-01-01 PROCEDURE — 250N000011 HC RX IP 250 OP 636: Performed by: EMERGENCY MEDICINE

## 2022-01-01 PROCEDURE — 84484 ASSAY OF TROPONIN QUANT: CPT | Performed by: EMERGENCY MEDICINE

## 2022-01-01 PROCEDURE — 258N000003 HC RX IP 258 OP 636: Performed by: EMERGENCY MEDICINE

## 2022-01-01 PROCEDURE — 99285 EMERGENCY DEPT VISIT HI MDM: CPT | Mod: 25

## 2022-01-01 RX ORDER — ONDANSETRON 2 MG/ML
4 INJECTION INTRAMUSCULAR; INTRAVENOUS EVERY 30 MIN PRN
Status: DISCONTINUED | OUTPATIENT
Start: 2022-01-01 | End: 2022-01-01 | Stop reason: HOSPADM

## 2022-01-01 RX ORDER — SODIUM CHLORIDE 9 MG/ML
INJECTION, SOLUTION INTRAVENOUS CONTINUOUS
Status: DISCONTINUED | OUTPATIENT
Start: 2022-01-01 | End: 2022-01-01 | Stop reason: HOSPADM

## 2022-01-01 RX ORDER — ONDANSETRON 4 MG/1
4 TABLET, ORALLY DISINTEGRATING ORAL EVERY 8 HOURS PRN
Qty: 10 TABLET | Refills: 0 | Status: SHIPPED | OUTPATIENT
Start: 2022-01-01 | End: 2022-01-01

## 2022-01-01 RX ADMIN — SODIUM CHLORIDE 1000 ML: 9 INJECTION, SOLUTION INTRAVENOUS at 12:36

## 2022-01-01 RX ADMIN — ONDANSETRON 4 MG: 2 INJECTION INTRAMUSCULAR; INTRAVENOUS at 12:36

## 2022-01-01 ASSESSMENT — ENCOUNTER SYMPTOMS
CHILLS: 0
ABDOMINAL PAIN: 1
BLOOD IN STOOL: 0
LIGHT-HEADEDNESS: 1
FEVER: 0
MYALGIAS: 0
DIARRHEA: 1
VOMITING: 0
SHORTNESS OF BREATH: 0
NAUSEA: 1

## 2022-03-10 NOTE — Clinical Note
Nelida Montano was seen and treated in our emergency department on 3/10/2022.  She may return to work on 03/12/2022.       If you have any questions or concerns, please don't hesitate to call.      Ivette Dickey MD

## 2022-03-10 NOTE — ED PROVIDER NOTES
History   Chief Complaint:  Chest Pain     The history is provided by the patient and medical records.      Nelida Montano is a 21 year old female with history of schizophrenia, borderline personality disorder, and polysubstance abuse who presents with chest pain, nausea, and diarrhea. The patient states she had a sudden onset of mid sternal chest pain on Tuesday, 2 days ago. She notes chest pain is intermittent and has no exacerbating or alleviating factors, as chest pain occurs when she is at rest or while she is at work. She also endorses onset of nausea and diarrhea 2 days ago, as well as diffuse lower abdominal cramping. She states she has 2-3 episodes of diarrhea daily. This morning she had an onset of lightheadedness as well. She denies any fevers, chills, shortness of breath, emesis, blood or mucus in the stool, leg swelling, or calf pain. She endorses good fluid intake. She denies history of heart disease or abdominal surgeries. She endorses nicotine vape use.     Review of Systems   Constitutional: Negative for chills and fever.   Respiratory: Negative for shortness of breath.    Cardiovascular: Positive for chest pain. Negative for leg swelling.   Gastrointestinal: Positive for abdominal pain, diarrhea and nausea. Negative for blood in stool and vomiting.   Musculoskeletal: Negative for myalgias.   Neurological: Positive for light-headedness.   All other systems reviewed and are negative.    Allergies:  The patient has no known allergies.     Medications:  Clozaril  Risperdal   Zoloft     Past Medical History:     Psychosis   Morbid obesity  Auditory hallucinations  Borderline personality disorder  Cannabis abuse  Alcohol abuse   Asthma  Schizophrenia  Suicidal ideation  Suicide attempt by drug overdose   Lysergic acid diethylamide abuse      Cocaine abuse  Methamphetamine abuse  Depression      Family History:    Mother - Stroke  Father - Seizures     Social History:  The patient was  "unaccompanied to the emergency department.    Physical Exam     Patient Vitals for the past 24 hrs:   BP Temp Temp src Pulse Resp SpO2 Height Weight   03/10/22 1142 136/72 97.4  F (36.3  C) Temporal 97 18 100 % 1.6 m (5' 3\") 117.9 kg (260 lb)     Physical Exam  General: alert, lying comfortably on gurney  HENT: mucous membranes moist  CV: regular rate, regular rhythm  Resp: normal effort, clear throughout, no crackles or wheezing  GI: abdomen soft and nontender, no guarding  MSK: no bony tenderness  Skin: appropriately warm and dry  Extremities: no edema, calves non-tender  Neuro: alert, clear speech, oriented  Psych: normal mood and affect    Emergency Department Course   ECG  ECG obtained at 1150, ECG read at 1235  Normal sinus rhythm  Minimal voltage criteria for LVH, may be normal variant   Nonspecific T wave abnormality   Abnormal ECG   No significant change as compared to prior, dated 06/01/21.  Rate 97 bpm. AK interval 136 ms. QRS duration 66 ms. QT/QTc 352/447 ms. P-R-T axes 42 1 50.     Imaging:  Chest XR,  PA & LAT   Final Result   IMPRESSION: Normal two views of the chest.       LEONOR KHOURY MD            SYSTEM ID:  [id number]      Report per radiology    Laboratory:  Labs Ordered and Resulted from Time of ED Arrival to Time of ED Departure   BASIC METABOLIC PANEL - Abnormal       Result Value    Sodium 139      Potassium 4.3      Chloride 107      Carbon Dioxide (CO2) 28      Anion Gap 4      Urea Nitrogen 8      Creatinine 1.12 (*)     Calcium 9.0      Glucose 96      GFR Estimate 71     CBC WITH PLATELETS AND DIFFERENTIAL - Abnormal    WBC Count 9.0      RBC Count 4.24      Hemoglobin 12.5      Hematocrit 39.8      MCV 94      MCH 29.5      MCHC 31.4 (*)     RDW 13.9      Platelet Count 349      % Neutrophils 57      % Lymphocytes 34      % Monocytes 7      % Eosinophils 2      % Basophils 0      % Immature Granulocytes 0      NRBCs per 100 WBC 0      Absolute Neutrophils 5.1      Absolute " Lymphocytes 3.1      Absolute Monocytes 0.6      Absolute Eosinophils 0.2      Absolute Basophils 0.0      Absolute Immature Granulocytes 0.0      Absolute NRBCs 0.0     TROPONIN I - Normal    Troponin I High Sensitivity <3        Emergency Department Course:     Reviewed:  I reviewed nursing notes, vitals, past medical history and Care Everywhere    Assessments:  1217 I obtained history and examined the patient as noted above.   1349 I rechecked the patient and explained findings.     Interventions:  1236 0.9% sodium chloride BOLUS 1000 mL IV  1236 Zofran 4 mg IV    Disposition:  The patient was discharged to home.     Impression & Plan   Medical Decision Making:  Nelida Montano is a 21 year old female with history of schizophrenia, borderline personality disorder, and polysubstance abuse who presents with chest pain, nausea, and diarrhea.  On exam, she has normal vitals, and a benign abdomen.  The work up in the Emergency Department is negative.  The differential diagnosis of chest pain is broad and includes life threatening etiologies such as acute coronary syndrome, myocardial infarction, pulmonary embolism, acute aortic dissection,  or esophageal rupture.  Other causes may include pneumonia, pneumothorax, pericarditis, myocarditis, pleurisy, esophageal spasm.  No serious etiology for the chest pain were detected today during this visit.  Given she is low risk, she was ruled out via PERC rule for PE.  EKG does not demonstrate any ischemic findings, and troponin is undetectable.  She is very low risk for ACS, and given atypical symptoms, provocative testing is not indicated.  In regards to the diarrhea, she appears slightly volume depleted, as evidenced by slightly elevated creatinine.  She feels better following bolus of fluid and Zofran.  At this point, no concern for toxic megacolon, invasive bacterial enteritis, sepsis, C. difficile, or other dangerous condition.  Plan for continued symptomatic  care at home.  Close follow up with primary care is indicated should the pain continue, as further work up may be performed; this was made clear to the patient, who understands.     Diagnosis:    ICD-10-CM    1. Atypical chest pain  R07.89    2. Non-intractable vomiting with nausea, unspecified vomiting type  R11.2    3. Diarrhea, unspecified type  R19.7      Scribe Disclosure:  I, Katarzyna Bustos, am serving as a scribe at 11:54 AM on 3/10/2022 to document services personally performed by Ivette Dickey MD based on my observations and the provider's statements to me.      Ivette Dickey MD  03/10/22 0907

## 2022-03-10 NOTE — ED TRIAGE NOTES
Pt report having a few days of increased CP. Pt states that the pain is mid-sternal and does not radiate. Pt states that the pain has come and gone over the past few days.